# Patient Record
Sex: MALE | Race: OTHER | Employment: OTHER | URBAN - METROPOLITAN AREA
[De-identification: names, ages, dates, MRNs, and addresses within clinical notes are randomized per-mention and may not be internally consistent; named-entity substitution may affect disease eponyms.]

---

## 2020-04-19 ENCOUNTER — APPOINTMENT (OUTPATIENT)
Dept: GENERAL RADIOLOGY | Facility: HOSPITAL | Age: 80
DRG: 207 | End: 2020-04-19
Attending: EMERGENCY MEDICINE
Payer: MEDICARE

## 2020-04-19 ENCOUNTER — HOSPITAL ENCOUNTER (INPATIENT)
Facility: HOSPITAL | Age: 80
LOS: 30 days | Discharge: ANOTHER HOSPITAL | DRG: 207 | End: 2020-05-19
Attending: EMERGENCY MEDICINE | Admitting: EMERGENCY MEDICINE
Payer: MEDICARE

## 2020-04-19 DIAGNOSIS — U07.1 COVID-19 VIRUS INFECTION: Primary | ICD-10-CM

## 2020-04-19 DIAGNOSIS — J96.01 ACUTE RESPIRATORY FAILURE WITH HYPOXIA (HCC): ICD-10-CM

## 2020-04-19 PROCEDURE — 71045 X-RAY EXAM CHEST 1 VIEW: CPT | Performed by: EMERGENCY MEDICINE

## 2020-04-19 PROCEDURE — 99223 1ST HOSP IP/OBS HIGH 75: CPT | Performed by: HOSPITALIST

## 2020-04-19 RX ORDER — HYDROXYCHLOROQUINE SULFATE 200 MG/1
200 TABLET, FILM COATED ORAL 2 TIMES DAILY
Status: DISCONTINUED | OUTPATIENT
Start: 2020-04-20 | End: 2020-04-20

## 2020-04-19 RX ORDER — ACETAMINOPHEN 325 MG/1
650 TABLET ORAL EVERY 6 HOURS PRN
Status: DISCONTINUED | OUTPATIENT
Start: 2020-04-19 | End: 2020-05-19

## 2020-04-19 RX ORDER — ASPIRIN 81 MG/1
81 TABLET ORAL DAILY
Status: DISCONTINUED | OUTPATIENT
Start: 2020-04-20 | End: 2020-04-30 | Stop reason: SDUPTHER

## 2020-04-19 RX ORDER — AZITHROMYCIN 250 MG/1
500 TABLET, FILM COATED ORAL DAILY
Status: DISCONTINUED | OUTPATIENT
Start: 2020-04-20 | End: 2020-04-20

## 2020-04-19 RX ORDER — MORPHINE SULFATE 2 MG/ML
2 INJECTION, SOLUTION INTRAMUSCULAR; INTRAVENOUS EVERY 2 HOUR PRN
Status: DISCONTINUED | OUTPATIENT
Start: 2020-04-19 | End: 2020-04-23

## 2020-04-19 RX ORDER — ONDANSETRON 2 MG/ML
4 INJECTION INTRAMUSCULAR; INTRAVENOUS EVERY 6 HOURS PRN
Status: DISCONTINUED | OUTPATIENT
Start: 2020-04-19 | End: 2020-05-19

## 2020-04-19 RX ORDER — BISACODYL 10 MG
10 SUPPOSITORY, RECTAL RECTAL
Status: DISCONTINUED | OUTPATIENT
Start: 2020-04-19 | End: 2020-04-30

## 2020-04-19 RX ORDER — METRONIDAZOLE 500 MG/1
500 TABLET ORAL 3 TIMES DAILY
Status: ON HOLD | COMMUNITY
End: 2020-05-19

## 2020-04-19 RX ORDER — ATORVASTATIN CALCIUM 10 MG/1
10 TABLET, FILM COATED ORAL NIGHTLY
Status: DISCONTINUED | OUTPATIENT
Start: 2020-04-20 | End: 2020-05-02

## 2020-04-19 RX ORDER — ZOLPIDEM TARTRATE 5 MG/1
5 TABLET ORAL NIGHTLY PRN
Status: DISCONTINUED | OUTPATIENT
Start: 2020-04-19 | End: 2020-05-19

## 2020-04-19 RX ORDER — LEVOFLOXACIN 500 MG/1
500 TABLET, FILM COATED ORAL DAILY
Status: DISCONTINUED | OUTPATIENT
Start: 2020-04-20 | End: 2020-04-20

## 2020-04-19 RX ORDER — CODEINE PHOSPHATE AND GUAIFENESIN 10; 100 MG/5ML; MG/5ML
5 SOLUTION ORAL EVERY 4 HOURS PRN
Status: DISCONTINUED | OUTPATIENT
Start: 2020-04-19 | End: 2020-05-19

## 2020-04-19 RX ORDER — ATORVASTATIN CALCIUM 10 MG/1
10 TABLET, FILM COATED ORAL NIGHTLY
COMMUNITY

## 2020-04-19 RX ORDER — HYDROXYCHLOROQUINE SULFATE 200 MG/1
400 TABLET, FILM COATED ORAL ONCE
Status: COMPLETED | OUTPATIENT
Start: 2020-04-19 | End: 2020-04-19

## 2020-04-19 RX ORDER — NIFEDIPINE 60 MG/1
60 TABLET, EXTENDED RELEASE ORAL DAILY
Status: DISCONTINUED | OUTPATIENT
Start: 2020-04-20 | End: 2020-05-01

## 2020-04-19 RX ORDER — HYDROXYCHLOROQUINE SULFATE 200 MG/1
400 TABLET, FILM COATED ORAL ONCE
Status: COMPLETED | OUTPATIENT
Start: 2020-04-20 | End: 2020-04-20

## 2020-04-19 RX ORDER — MORPHINE SULFATE 4 MG/ML
4 INJECTION, SOLUTION INTRAMUSCULAR; INTRAVENOUS EVERY 2 HOUR PRN
Status: DISCONTINUED | OUTPATIENT
Start: 2020-04-19 | End: 2020-05-11

## 2020-04-19 RX ORDER — PANTOPRAZOLE SODIUM 40 MG/1
40 TABLET, DELAYED RELEASE ORAL
Status: DISCONTINUED | OUTPATIENT
Start: 2020-04-20 | End: 2020-04-23

## 2020-04-19 RX ORDER — HYDROCODONE BITARTRATE AND ACETAMINOPHEN 5; 325 MG/1; MG/1
1 TABLET ORAL EVERY 6 HOURS PRN
Status: DISCONTINUED | OUTPATIENT
Start: 2020-04-19 | End: 2020-04-23

## 2020-04-19 RX ORDER — SODIUM CHLORIDE 9 MG/ML
INJECTION, SOLUTION INTRAVENOUS CONTINUOUS
Status: ACTIVE | OUTPATIENT
Start: 2020-04-19 | End: 2020-04-20

## 2020-04-19 RX ORDER — ENOXAPARIN SODIUM 100 MG/ML
40 INJECTION SUBCUTANEOUS EVERY 12 HOURS SCHEDULED
Status: DISCONTINUED | OUTPATIENT
Start: 2020-04-20 | End: 2020-04-25

## 2020-04-19 RX ORDER — ACETAMINOPHEN 325 MG/1
650 TABLET ORAL EVERY 6 HOURS PRN
COMMUNITY

## 2020-04-19 RX ORDER — LEVOFLOXACIN 500 MG/1
500 TABLET, FILM COATED ORAL DAILY
Status: ON HOLD | COMMUNITY
End: 2020-05-19

## 2020-04-19 RX ORDER — NIFEDIPINE 60 MG/1
60 TABLET, FILM COATED, EXTENDED RELEASE ORAL DAILY
Status: ON HOLD | COMMUNITY
End: 2020-05-19

## 2020-04-19 RX ORDER — ONDANSETRON 2 MG/ML
4 INJECTION INTRAMUSCULAR; INTRAVENOUS EVERY 4 HOURS PRN
Status: COMPLETED | OUTPATIENT
Start: 2020-04-19 | End: 2020-05-10

## 2020-04-19 RX ORDER — AZITHROMYCIN 250 MG/1
500 TABLET, FILM COATED ORAL ONCE
Status: COMPLETED | OUTPATIENT
Start: 2020-04-19 | End: 2020-04-19

## 2020-04-19 RX ORDER — HYDRALAZINE HYDROCHLORIDE 20 MG/ML
10 INJECTION INTRAMUSCULAR; INTRAVENOUS EVERY 4 HOURS PRN
Status: DISCONTINUED | OUTPATIENT
Start: 2020-04-19 | End: 2020-05-19

## 2020-04-20 PROCEDURE — 99232 SBSQ HOSP IP/OBS MODERATE 35: CPT | Performed by: HOSPITALIST

## 2020-04-20 PROCEDURE — 99221 1ST HOSP IP/OBS SF/LOW 40: CPT | Performed by: INTERNAL MEDICINE

## 2020-04-20 RX ORDER — DOXEPIN HYDROCHLORIDE 50 MG/1
1 CAPSULE ORAL DAILY
Status: DISCONTINUED | OUTPATIENT
Start: 2020-04-20 | End: 2020-04-27

## 2020-04-20 RX ORDER — AZITHROMYCIN 250 MG/1
500 TABLET, FILM COATED ORAL DAILY
Status: DISPENSED | OUTPATIENT
Start: 2020-04-20 | End: 2020-04-24

## 2020-04-20 RX ORDER — HYDROXYCHLOROQUINE SULFATE 200 MG/1
200 TABLET, FILM COATED ORAL 2 TIMES DAILY
Status: DISPENSED | OUTPATIENT
Start: 2020-04-20 | End: 2020-04-24

## 2020-04-20 RX ORDER — POTASSIUM CHLORIDE 20 MEQ/1
40 TABLET, EXTENDED RELEASE ORAL EVERY 4 HOURS
Status: COMPLETED | OUTPATIENT
Start: 2020-04-20 | End: 2020-04-20

## 2020-04-20 NOTE — ED NOTES
High flow 02 decreased to 10L. Patient continues to lay in prone position. Socks and blanket given for comfort. Patient in no acute respiratory distress. Will continue to monitor.

## 2020-04-20 NOTE — ED NOTES
O2 decreased to 10L high flow. Patient resting comfortably in prone position. Respirations regular and nonlabored. Will continue to monitor.

## 2020-04-20 NOTE — DIETARY NOTE
ADULT NUTRITION INITIAL ASSESSMENT    RECOMMENDATIONS TO MD:  See Nutrition Intervention     Pt is at moderate nutrition risk. Pt does not meet malnutrition criteria.       NUTRITION DIAGNOSIS/PROBLEM:  Inadequate oral intake related to decreased ability t education needs  - Feeding assistance: meal set up  - Coordination of nutrition care: collaboration with other providers    ANTHROPOMETRICS:  HT: 175.3 cm (5' 9\")       WT: 81.6 kg (180 lb)      BMI: Body mass index is 26.58 kg/m².          BMI Classificat Clinical Dietitian  [phone]

## 2020-04-20 NOTE — PLAN OF CARE
Problem: Patient/Family Goals  Goal: Patient/Family Long Term Goal  Description  Patient's Long Term Goal: To go home    Interventions:    - See additional Care Plan goals for specific interventions  Outcome: Not Progressing  Note:   Patient is currently patient reports new pain  - Anticipate increased pain with activity and pre-medicate as appropriate  Outcome: Progressing  Note:   Denies any fever or body aches.  Although patient was shivering and had a fever earlier today, but denies any issues     Probl has a good o2 wave form in the 70's even with 10L high flow nc.       Problem: CARDIOVASCULAR - ADULT  Goal: Maintains optimal cardiac output and hemodynamic stability  Description  INTERVENTIONS:  - Monitor vital signs, rhythm, and trends  - Monitor for bl

## 2020-04-20 NOTE — PLAN OF CARE
Patient admitted from the Emergency Department overnight. He was able to lay in the prone position intermittently overnight, switching between prone and his side. Patient was received on 10L of oxygen via high flow nasal cannula.  Oxygen was able to be titr pre-medicate as appropriate  Outcome: Progressing     Problem: SAFETY ADULT - FALL  Goal: Free from fall injury  Description  INTERVENTIONS:  - Assess pt frequently for physical needs  - Identify cognitive and physical deficits and behaviors that affect ri as indicated  - Manage/alleviate anxiety  - Monitor for signs/symptoms of CO2 retention  Outcome: Progressing     Problem: CARDIOVASCULAR - ADULT  Goal: Maintains optimal cardiac output and hemodynamic stability  Description  INTERVENTIONS:  - Monitor sanjuana

## 2020-04-20 NOTE — PROGRESS NOTES
Pt admitted through ED this evening. EMS found patient in resp. Distress at home SPO2 60s on room air. Was transported to ED on non-rebreather with SPO2 in 80s. Pneumonia diagnosis at onset of symptoms 6 days ago. SOB and chest pain for last 6 days.     Rap

## 2020-04-20 NOTE — ED NOTES
Patient is resting comfortably in prone position. Continues to be on full continous monitoring and pulse oximetry.  High flow NC 15L

## 2020-04-20 NOTE — H&P
[address] Patient Status:  Emergency    1940 MRN [id number]   Location [address] [facility] Attending Sharad Mayberry MD   Hosp Day # 0 PCP Boubacar Buchanan, are intact, Normal conjunctiva. HENT:  Normocephalic, oral mucosa is moist.  Head:  Normocephalic, atraumatic. Neck:  Supple, non-tender, no carotid bruit, no jugular venous distention, no lymphadenopathy, no thyromegaly.   Respiratory: No appreciable cra and Plan:    Acute respiratory failure with hypoxia  Secondary to COVID-19 pneumonitis. Currently on supplemental O2 pulmonary has been consulted will continue to monitor closely.     Viral pneumonitis likely secondary to COVID-19  Rapid COVID testing posi

## 2020-04-20 NOTE — ED NOTES
Patient able to turn himself onto his stomach. Instructed to lay in prone position as long as tolerated. High flow NC in place on 15L. Patient's oxygen saturation improved from 87% to 93% with proning. Will continue to monitor.

## 2020-04-20 NOTE — PROGRESS NOTES
[facility]    Progress Note    Jahaira Ramirez Patient Status:  Inpatient    1940 MRN [id number]   Location [facility] 1W Attending Peace Moya MD   Hosp Day # 1 PCP Boubacar Buchanan MD        Subjective: 04/19/2020    TP 7.5 04/19/2020    AST 49 (H) 04/19/2020    ALT 53 04/19/2020    DDIMER 2.10 (H) 04/20/2020    CRP 16.30 (H) 04/20/2020    TROP <0.045 04/20/2020    CK 60 04/20/2020       Xr Chest Ap Portable  (cpt=71045)    Result Date: 4/19/2020  CONCLUS

## 2020-04-20 NOTE — CONSULTS
Pulmonary/Critical Care Consultation Note    HPI:   Xin Chandler is a 67year old male with Patient presents with:  Dyspnea NEELAM SOB    Boubacar Buchanan MD    Pt is a 66 yo with h/o HTN and HL who called EMS for SOB and fever.  Pt states he was PRN  zolpidem (AMBIEN) tab 5 mg, 5 mg, Oral, Nightly PRN  Pantoprazole Sodium (PROTONIX) EC tab 40 mg, 40 mg, Oral, QAM AC  bisacodyl (DULCOLAX) rectal suppository 10 mg, 10 mg, Rectal, Daily PRN  azithromycin (ZITHROMAX) tab 500 mg, 500 mg, Oral, Daily  g ALB 3.1 04/19/2020    ALKPHO 45 04/19/2020    BILT 0.6 04/19/2020    TP 7.5 04/19/2020    AST 49 04/19/2020    ALT 53 04/19/2020    DDIMER 2.10 04/20/2020    CRP 16.30 04/20/2020    CK 60 04/20/2020       CXR b/l GGO       ASSESSMENT/PLAN:     COVID-19  C/

## 2020-04-20 NOTE — CM/SW NOTE
PADMAJA spoke with the pt's son Mitra Mcnair via telephone. The pt. Is COVID+. The pt. Lives with his wife in a 2 level home with the bedrooms upstairs. The pt. Has been independent prior to admission with adls, ambulation and driving. The pt's wife also drives.

## 2020-04-20 NOTE — ED INITIAL ASSESSMENT (HPI)
ARRIVES VIA EMS FOR WORSENING SOB AND HYPOXIA. EMS REPORTS PATIENT'S SP02 WAS \"HIGH 60'S\" AND THEN PlACED ON NRB 15L. SP02 DURING TRANSPORT 80'S. PATIENT IS AAO X3, SPEECH CLEAR ALTHOUGH DOES APPEAR DROWSY. STATES HE HAS BEEN SICK FOR 1 WEEK.  DIAGNOSED W

## 2020-04-20 NOTE — ED PROVIDER NOTES
Patient Seen in: [facility] Emergency Department    History   Patient presents with:  Dyspnea NEELAM SOB      HPI    Patient presents to the ED via EMS for worsening shortness of breath and fevers.   Diagnosed with pneumonia a week ago at the onset of exam.  Stethoscope and any equipment used during my examination was cleaned with super sani-cloth germicidal wipes following the exam.     Physical Exam   Constitutional: He is oriented to person, place, and time.  He appears well-developed and well-nourish limits   RAPID SARS-COV-2 BY PCR - Abnormal; Notable for the following components:    Rapid SARS-CoV-2 by PCR Detected (*)     All other components within normal limits   CBC W/ DIFFERENTIAL - Abnormal; Notable for the following components:    WBC 11.9 (*) Hydroxychloroquine Sulfate (PLAQUENIL) tab 400 mg (has no administration in time range)   azithromycin (ZITHROMAX) tab 500 mg (has no administration in time range)         MDM      04/19/20 2015 04/19/20 2030 04/19/20 2045 04/19/20 2115   BP: 134/71 Clinical Impression:  COVID-19 virus infection  (primary encounter diagnosis)  Acute respiratory failure with hypoxia ([facility] [address]    Disposition:  Admit    Follow-up:  No follow-up provider specified.     Medications Prescribed:  There are no discharge medica

## 2020-04-21 PROCEDURE — 99233 SBSQ HOSP IP/OBS HIGH 50: CPT | Performed by: INTERNAL MEDICINE

## 2020-04-21 PROCEDURE — 99232 SBSQ HOSP IP/OBS MODERATE 35: CPT | Performed by: HOSPITALIST

## 2020-04-21 NOTE — PROGRESS NOTES
Pulmonary/Critical Care Follow Up Note    HPI:   Faisal Hernandez is a 67year old male with Patient presents with:  Dyspnea NEELAM SOB      PCP Boubacar Buchanan MD  Admission Attending Maritza Blair [facility] Day #2    Denies sob  Denies cou MG/5ML syrup 5 mL, 5 mL, Oral, Q4H PRN      Allergies:  No Known Allergies        PHYSICAL EXAM:   Blood pressure 102/62, pulse 89, temperature 97 °F (36.1 °C), temperature source Oral, resp.  rate (!) 34, height 5' 9\" (1.753 m), weight 180 lb (81.6 kg), S

## 2020-04-21 NOTE — PLAN OF CARE
Pt tolerating 10L high flow NC. Pt laying prone this shift. Uses call light. Bed alarm on. Will continue to monitor.      Problem: Patient/Family Goals  Goal: Patient/Family Long Term Goal  Description  Patient's Long Term Goal: To go home    [facility] Free from fall injury  Description  INTERVENTIONS:  - Assess pt frequently for physical needs  - Identify cognitive and physical deficits and behaviors that affect risk of falls.   - [place] fall precautions as indicated by assessment.  - Educate pt/famil for assistance (call light)  - Provide an  as needed  - Communicate barriers and strategies to overcome with those who interact with patient  Outcome: Progressing     Problem: RESPIRATORY - ADULT  Goal: Achieves optimal ventilation and oxygenati Progressing

## 2020-04-21 NOTE — PLAN OF CARE
Problem: Patient/Family Goals  Goal: Patient/Family Long Term Goal  Description  Patient's Long Term Goal: To go home    Interventions:    - See additional Care Plan goals for specific interventions  Outcome: Not Progressing  Note:   Patient remains in t any fevers or body aches during RNs shift     Problem: SAFETY ADULT - FALL  Goal: Free from fall injury  Description  INTERVENTIONS:  - Assess pt frequently for physical needs  - Identify cognitive and physical deficits and behaviors that affect risk of fa puncture sites for bleeding and/or hematoma  - Assess quality of pulses, skin color and temperature  - Assess for signs of decreased coronary artery perfusion - ex.  Angina  - Evaluate fluid balance, assess for edema, trend weights  Outcome: Progressing  No

## 2020-04-21 NOTE — PROGRESS NOTES
[facility]    Progress Note    Tanisha Stearns Patient Status:  Inpatient    1940 MRN [id number]   Location [facility] 1W Attending Yolie Beard MD   Hosp Day # 2 PCP Boubacar Buchanan MD        Subjective: 04/21/2020    CA 8.4 (L) 04/21/2020    ALB 3.1 (L) 04/19/2020    ALKPHO 45 04/19/2020    BILT 0.6 04/19/2020    TP 7.5 04/19/2020    AST 49 (H) 04/19/2020    ALT 53 04/19/2020    DDIMER 2.14 (H) 04/21/2020    CRP 18.20 (H) 04/21/2020    MG 2.4 04/21/2020

## 2020-04-22 PROCEDURE — 99233 SBSQ HOSP IP/OBS HIGH 50: CPT | Performed by: INTERNAL MEDICINE

## 2020-04-22 PROCEDURE — 99233 SBSQ HOSP IP/OBS HIGH 50: CPT | Performed by: HOSPITALIST

## 2020-04-22 RX ORDER — POTASSIUM CHLORIDE 20 MEQ/1
40 TABLET, EXTENDED RELEASE ORAL ONCE
Status: DISCONTINUED | OUTPATIENT
Start: 2020-04-22 | End: 2020-04-22

## 2020-04-22 RX ORDER — POTASSIUM CHLORIDE 20 MEQ/1
40 TABLET, EXTENDED RELEASE ORAL ONCE
Status: COMPLETED | OUTPATIENT
Start: 2020-04-22 | End: 2020-04-22

## 2020-04-22 NOTE — PLAN OF CARE
Problem: Patient/Family Goals  Goal: Patient/Family Long Term Goal  Description  Patient's Long Term Goal: To go home    Interventions:    - See additional Care Plan goals for specific interventions  Outcome: Not Progressing  Note:   Patient remains in P appropriate  - Consider OT/PT consult to assist with strengthening/mobility  - Encourage toileting schedule  Outcome: Progressing  Note:   Bed locked and lowest position, call light within reach of patient, alarms on, frequent nursing rounds, rails x3 up Dr. Iris Huston we placed a Bipap ventilation helmet on the patient at 100% fi02 sitting at a 90 degree angle in bed, tolerating well o2 saturations 98%-Appetite still not the greatest but has definitely been improving.  Called pts son Mirna Castillo to update him on pa

## 2020-04-22 NOTE — PROGRESS NOTES
Pulmonary/Critical Care Follow Up Note    HPI:   Jason Ellsworth is a 67year old male with Patient presents with:  Dyspnea NEELAM SOB      PCP Boubacar Buchanan MD  Admission Attending Dalia Cole [facility] Day #3    Denies sob  Denies cou MG/5ML syrup 5 mL, 5 mL, Oral, Q4H PRN      Allergies:  No Known Allergies        PHYSICAL EXAM:   Blood pressure 107/51, pulse 91, temperature 97.7 °F (36.5 °C), temperature source Axillary, resp.  rate (!) 33, height 5' 9\" (1.753 m), weight 180 lb (81.6

## 2020-04-22 NOTE — PROGRESS NOTES
[facility]    Progress Note    Homa Cobb Patient Status:  Inpatient    1940 MRN [id number]   Location [facility] 1W Attending Scott Lane MD   Hosp Day # 3 PCP Boubacar Buchanan MD        Subjective: ALT 53 04/19/2020    DDIMER 1.86 (H) 04/22/2020    CRP 18.70 (H) 04/22/2020    MG 2.3 04/22/2020    PHOS 2.8 04/22/2020    TROP <0.045 04/20/2020    CK 60 04/20/2020                      BELLA AMOR

## 2020-04-22 NOTE — PLAN OF CARE
Pt A/O x 4, remains prone. O2 sat 89-93 on 14L HFNC, intermittently tachypneic. SBP low 100's. No bm through shift. Urine output 550. All precautions maintained. Continue to monitor.    Problem: Patient/Family Goals  Goal: Patient/Family Short Term Goal  [place] deficits and behaviors that affect risk of falls.   - [place] fall precautions as indicated by assessment.  - Educate pt/family on patient safety including physical limitations  - Instruct pt to call for assistance with activity based on assessment  - Mod interact with patient  Outcome: Progressing     Problem: RESPIRATORY - ADULT  Goal: Achieves optimal ventilation and oxygenation  Description  INTERVENTIONS:  - Assess for changes in respiratory status  - Assess for changes in mentation and behavior  - Pos

## 2020-04-23 ENCOUNTER — APPOINTMENT (OUTPATIENT)
Dept: GENERAL RADIOLOGY | Facility: HOSPITAL | Age: 80
DRG: 207 | End: 2020-04-23
Attending: INTERNAL MEDICINE
Payer: MEDICARE

## 2020-04-23 ENCOUNTER — APPOINTMENT (OUTPATIENT)
Dept: GENERAL RADIOLOGY | Facility: HOSPITAL | Age: 80
DRG: 207 | End: 2020-04-23
Attending: HOSPITALIST
Payer: MEDICARE

## 2020-04-23 PROCEDURE — 71045 X-RAY EXAM CHEST 1 VIEW: CPT | Performed by: INTERNAL MEDICINE

## 2020-04-23 PROCEDURE — 71045 X-RAY EXAM CHEST 1 VIEW: CPT | Performed by: HOSPITALIST

## 2020-04-23 PROCEDURE — 99232 SBSQ HOSP IP/OBS MODERATE 35: CPT | Performed by: HOSPITALIST

## 2020-04-23 PROCEDURE — 99291 CRITICAL CARE FIRST HOUR: CPT | Performed by: INTERNAL MEDICINE

## 2020-04-23 RX ORDER — DEXMEDETOMIDINE HYDROCHLORIDE 4 UG/ML
INJECTION, SOLUTION INTRAVENOUS
Status: COMPLETED
Start: 2020-04-23 | End: 2020-04-23

## 2020-04-23 RX ORDER — DEXMEDETOMIDINE HYDROCHLORIDE 4 UG/ML
INJECTION, SOLUTION INTRAVENOUS CONTINUOUS
Status: CANCELLED | OUTPATIENT
Start: 2020-04-23

## 2020-04-23 RX ORDER — MORPHINE SULFATE 2 MG/ML
2 INJECTION, SOLUTION INTRAMUSCULAR; INTRAVENOUS EVERY 2 HOUR PRN
Status: DISCONTINUED | OUTPATIENT
Start: 2020-04-23 | End: 2020-05-19

## 2020-04-23 RX ORDER — DEXMEDETOMIDINE HYDROCHLORIDE 4 UG/ML
INJECTION, SOLUTION INTRAVENOUS CONTINUOUS
Status: DISCONTINUED | OUTPATIENT
Start: 2020-04-23 | End: 2020-05-01

## 2020-04-23 RX ORDER — LORAZEPAM 2 MG/ML
1 INJECTION INTRAMUSCULAR EVERY 4 HOURS PRN
Status: DISCONTINUED | OUTPATIENT
Start: 2020-04-23 | End: 2020-05-19

## 2020-04-23 NOTE — PROGRESS NOTES
Pulmonary/Critical Care Follow Up Note    HPI:   Citlali Chavez is a 67year old male with Patient presents with:  Dyspnea NEELAM SOB      PCP Boubacar Buchanan MD  Admission Attending [facility]    [facility] zolpidem (AMBIEN) tab 5 mg, 5 mg, Oral, Nightly PRN  •  Pantoprazole Sodium (PROTONIX) EC tab 40 mg, 40 mg, Oral, QAM AC  •  bisacodyl (DULCOLAX) rectal suppository 10 mg, 10 mg, Rectal, Daily PRN  •  guaiFENesin-codeine (ROBITUSSIN AC) 100-10 MG/5ML syrup

## 2020-04-23 NOTE — RESPIRATORY THERAPY NOTE
Patient tachypneic, not tolerating gomez on BIPAP. Switched over to [facility] with hepa exp filter. Current settings IPAP10, EPAP 5, FIO2 100%. Patient is pulling adequate Vt. sats are in the low 90's. ABG results on settings above:       Ref.  Range 4/23/2020 05:

## 2020-04-23 NOTE — PROGRESS NOTES
Received call from RN at [facility] regarding patient's refusal to wear bipap helmet. SPO2 high 80's. Will try bipap mask instead of helmet and see how he tolerates it. Current settings are 100%, +10/5. Patient tolerating well with SPO2 90-92%. RR 40.   ABG

## 2020-04-23 NOTE — PLAN OF CARE
Problem: Patient/Family Goals  Goal: Patient/Family Long Term Goal  Description  Patient's Long Term Goal: To go home    Interventions:    - See additional Care Plan goals for specific interventions  Outcome: Not Progressing  Note:   Patient remains in t as appropriate  - Consider OT/PT consult to assist with strengthening/mobility  - Encourage toileting schedule  Outcome: Progressing  Note:   Bed locked and lowest position, call light within reach of patient, alarms on, frequent nursing rounds     Problem placed to right nare, confirmed by Xray in the stomach. No urine output during RNs shift today. Patient was bladder scanned, over 560 of urine. Villareal placed 475cc urine output noted immediately. Cass urine, U/A and culture sent.  On Precedex gtt tolerating

## 2020-04-23 NOTE — PROGRESS NOTES
[facility]    Progress Note    Bobbi Menezes Patient Status:  Inpatient    1940 MRN [id number]   Location [facility] 1W Attending Hermelindo Bojorquez MD   Hosp Day # 4 PCP Boubacar Buchanan MD        Subjective: 04/19/2020    DDIMER 12.50 (H) 04/23/2020    CRP 20.70 (H) 04/23/2020    MG 2.3 04/22/2020    PHOS 2.8 04/22/2020    TROP <0.045 04/20/2020    CK 60 04/20/2020                      BELLA AMOR

## 2020-04-23 NOTE — PROGRESS NOTES
Called by RN stating patient refusing to wear bipap helmet all evening and SpO2 82-85% on 15L rebreather mask and HFNL 8L. Patient spoken to multiple times that he needs helmet and risking intubation.   RN requesting sedation for patient as he appears agit

## 2020-04-23 NOTE — PROGRESS NOTES
0025 pt refusing to continue wearing bipap helmet because he states it is extremely hot and he cannot take it anymore. Put pt on non rebreather mask 15L and HFNC 8L together.  Per Dr. Татьяна Thomas, as long as pt sat is >86% consistently, he is okay to stay off o

## 2020-04-23 NOTE — PROGRESS NOTES
0400 Dr. Jesse Hansen called due to pt RR 50-60s on bipap helmet. States okay that respirations are higher as long as his saturations stay high 80's. Verified okay to keep on precedex.      0430 Pt refusing helmet and getting very hot with excessive condensatio

## 2020-04-24 PROCEDURE — 99291 CRITICAL CARE FIRST HOUR: CPT | Performed by: INTERNAL MEDICINE

## 2020-04-24 NOTE — PLAN OF CARE
Problem: Patient/Family Goals  Goal: Patient/Family Short Term Goal  Description  Patient's Short Term Goal: to be able to lay on my back again  Interventions:   - See additional Care Plan goals for specific interventions  4/24/2020 1728 by Michelle Núñez frequently for physical needs  - Identify cognitive and physical deficits and behaviors that affect risk of falls.   - [place] fall precautions as indicated by assessment.  - Educate pt/family on patient safety including physical limitations  - Instruct p difficulty  - Respiratory Therapy support as indicated  - Manage/alleviate anxiety  - Monitor for signs/symptoms of CO2 retention  4/24/2020 1728 by Regina Austin, RN  Outcome: Progressing  Pt Bipap dependent, able to tolerate FIO2 decrease from 100 to 85%. tolerated  - Evaluate effectiveness of GI medications  - Encourage mobilization and activity  - Obtain nutritional consult as needed  - Establish a toileting routine/schedule  - Consider collaborating with pharmacy to review patient's medication profile  O

## 2020-04-24 NOTE — DIETARY NOTE
ADULT NUTRITION REASSESSMENT     RECOMMENDATIONS TO MD:  See Nutrition Intervention for TF orders. Maintain  70-80% of goal nutrition over the first 5-7 days. Pt is at high nutrition risk.       NUTRITION DIAGNOSIS/PROBLEM:  Inadequate oral intake re rate of 60 ml/hr via Keofeed on ave 22 hr infusion time. · 1 pack ProSource daily              · Water flushes of 30ml q 4 hr (180 ml). (TF provides 1584 kcal, 72 g pro & 1049 ml free water + ProSource 40 kcal, 11 g Pro & 45 ml vol.  Total of 1624 kcal, [id number] with limited contact restrictions and Isolation Precautions. - Fluid Accumulation: none   - Skin Integrity: at risk.      NUTRITION PRESCRIPTION: Estimated Nutrition needs:   Calories: 3428-9080 calories/day (25-30 calories per kg Actual body wt (

## 2020-04-24 NOTE — PLAN OF CARE
Pt A/O x 4. O2 sat 89-93 on continuous Bipap mask. Respirations improved significantly compared to the previous night (28-40). Pt was able to sleep a good portion of the night. Villareal intact and draining. All precautions maintained. Continue to monitor. Instruct pt to call for assistance with activity based on assessment  - Modify environment to reduce risk of injury  - Provide assistive devices as appropriate  - Consider OT/PT consult to assist with strengthening/mobility  - Encourage toileting schedule in respiratory status  - Assess for changes in mentation and behavior  - Position to facilitate oxygenation and minimize respiratory effort  - Oxygen supplementation based on oxygen saturation or ABGs  - Provide Smoking Cessation handout, if applicable  -

## 2020-04-24 NOTE — PAYOR COMM NOTE
--------------  ADMISSION REVIEW     Shailesh Hodge MA [facility]  Subscriber #:  [id number]  Authorization Number: [id number]      Admit date: 4/19/20  Admit time: 2200       Admitting Physician: Daniella Kurtz MD  Attending Physician:  Adela Joe MD  [facility] Social History and Family History elements reviewed from today, pertinent positives to the presenting problem noted.     Physical Exam     ED Triage Vitals   BP 04/19/20 1935 125/53   Pulse 04/19/20 1935 98   Resp 04/19/20 1943 (!) 30   Temp 04/19/20 1935 9 All other components within normal limits   HEPATIC FUNCTION PANEL (7) - Abnormal; Notable for the following components:    AST 49 (*)     Albumin 3.1 (*)     All other components within normal limits   PROCALCITONIN - Abnormal; Notable for the following Reading: Incomplete right bundle branch block, left axis, nonspecific ST depression, abnormal EKG             Imaging Results Available and Reviewed while in ED: Xr Chest Ap Portable  (cpt=71045)    Result Date: 4/19/2020  CONCLUSION:   Bilateral perihilar ED Course: Presents to the ED with severe respiratory distress symptoms. Severely hypoxic even on a nonrebreather. Clinically suspicious for COVID-19. Patient placed on high flow nasal cannula and placed in a prone position. Significantly improved.   O2 Date:  4/19/2020  Date of Admission:  4/19/2020    Chief Complaint:  Patient presents with:  Dyspnea NEELAM SOB      History of Present Illness:  Victoriano Garza is a(n) 67year old male, with no significant past medical history other than hypertension hype Respiratory: No appreciable crackles, respirations are moderately-labored, breath sounds are equal, symmetrical chest wall expansion. Cardiovascular:  Normal rate, regular rhythm, no murmur, no edema.   Gastrointestinal:  Soft, non-tender, non-distended, n Viral pneumonitis likely secondary to COVID-19  Rapid COVID testing positive, inflammatory markers elevated. Patient be started on Plaquenil and Zithromax, will continue the same.     Essential hypertension  Blood pressure well controlled resume home medic Date Action Dose Route User    4/23/2020 2014 Given 200 mg Oral Sandie Cárdenas, RN      Pantoprazole Sodium (PROTONIX) 40 mg in Sodium Chloride 0.9 % 10 mL IV push     Date Action Dose Route User    4/23/2020 1245 Given 40 mg Intravenous Mello Vo acetaminophen (TYLENOL) tab 650 mg, 650 mg, Oral, Q6H PRN  NIFEdipine ER osmotic release (PROCARDIA XL) 24 hr tab 60 mg, 60 mg, Oral, Daily  ondansetron HCl (ZOFRAN) injection 4 mg, 4 mg, Intravenous, Q6H PRN  morphINE sulfate (PF) 2 MG/ML injection 2 mg, Last data filed at 4/20/2020 0351      Gross per 24 hour   Intake 500 ml   Output 480 ml   Net 20 ml      NAD  Prone  Mild inc WOB  Head AT/NC  Anicteric  Lung cta  CV reg tachy  Abd soft NT/ND  Ext No edema  Skin No rash  Psych Normal mood        LABS PLEASE FAX DAYS CERTIFIED AND NEXT REVIEW DATES

## 2020-04-24 NOTE — PROGRESS NOTES
Pulmonary/Critical Care Follow Up Note    HPI:   Thierry Saenz is a 67year old male with Patient presents with:  Dyspnea NEELAM SOB      PCP Boubacar Buchanan MD  Admission Attending Janell Saavedra 15 Day #5      Pt on NIV  States br HCl (APRESOLINE) injection 10 mg, 10 mg, Intravenous, Q4H PRN  •  zolpidem (AMBIEN) tab 5 mg, 5 mg, Oral, Nightly PRN  •  bisacodyl (DULCOLAX) rectal suppository 10 mg, 10 mg, Rectal, Daily PRN  •  guaiFENesin-codeine (ROBITUSSIN AC) 100-10 MG/5ML syrup 5

## 2020-04-24 NOTE — CM/SW NOTE
Care Progression Note:  Active Acute Medical Issue:   COVID-19 virus infection     Other Contributing Medical Factors/Dx. : COVID 19+ , not intubated, currently on BiPAP    Length of stay: 5  Avoidable Delays:   Discharge Barriers:   Expected discharge date

## 2020-04-25 ENCOUNTER — APPOINTMENT (OUTPATIENT)
Dept: GENERAL RADIOLOGY | Facility: HOSPITAL | Age: 80
DRG: 207 | End: 2020-04-25
Attending: INTERNAL MEDICINE
Payer: MEDICARE

## 2020-04-25 PROCEDURE — 71045 X-RAY EXAM CHEST 1 VIEW: CPT | Performed by: INTERNAL MEDICINE

## 2020-04-25 PROCEDURE — 99233 SBSQ HOSP IP/OBS HIGH 50: CPT | Performed by: INTERNAL MEDICINE

## 2020-04-25 RX ORDER — ENOXAPARIN SODIUM 100 MG/ML
0.6 INJECTION SUBCUTANEOUS EVERY 12 HOURS SCHEDULED
Status: DISCONTINUED | OUTPATIENT
Start: 2020-04-25 | End: 2020-05-01

## 2020-04-25 NOTE — PLAN OF CARE
Problem: Patient/Family Goals  Goal: Patient/Family Long Term Goal  Description  Patient's Long Term Goal: To go home    Interventions:    - See additional Care Plan goals for specific interventions  Outcome: Progressing  Goal: Patient/Family Short Term physical deficits and behaviors that affect risk of falls.   - [place] fall precautions as indicated by assessment.  - Educate pt/family on patient safety including physical limitations  - Instruct pt to call for assistance with activity based on assessme those who interact with patient  Outcome: Progressing     Problem: CARDIOVASCULAR - ADULT  Goal: Maintains optimal cardiac output and hemodynamic stability  Description  INTERVENTIONS:  - Monitor vital signs, rhythm, and trends  - Monitor for bleeding, hyp collaborating with pharmacy to review patient's medication profile  Outcome: Progressing  Goal: Maintains adequate nutritional intake (undernourished)  Description  INTERVENTIONS:  - Monitor percentage of each meal consumed  - Identify factors contributing

## 2020-04-25 NOTE — PROGRESS NOTES
S- made visit at nurse and patient request. Patient resting in bed  O- Patient expressed desire for prayer, he expressed acceptance about    his current health condition  A-  offered prayer and emotional support  P- No follow-up needed, unl

## 2020-04-26 ENCOUNTER — APPOINTMENT (OUTPATIENT)
Dept: GENERAL RADIOLOGY | Facility: HOSPITAL | Age: 80
DRG: 207 | End: 2020-04-26
Attending: INTERNAL MEDICINE
Payer: MEDICARE

## 2020-04-26 PROCEDURE — 71045 X-RAY EXAM CHEST 1 VIEW: CPT | Performed by: INTERNAL MEDICINE

## 2020-04-26 PROCEDURE — 99233 SBSQ HOSP IP/OBS HIGH 50: CPT | Performed by: INTERNAL MEDICINE

## 2020-04-26 NOTE — PLAN OF CARE
Problem: Patient Centered Care  Goal: Patient preferences are identified and integrated in the patient's plan of care  Description  Interventions:  - What would you like us to know as we care for you?  I speak [place]  - Provide timely, complete, and accu schedule  Outcome: Progressing     Problem: Altered Communication/Language Barrier  Goal: Patient/Family is able to understand and participate in their care  Description  Interventions:  - Assess communication ability and preferred communication style  - I ordered  - Evaluate effectiveness of ordered antiemetic medications  - Provide nonpharmacologic comfort measures as appropriate  - Advance diet as tolerated, if ordered  - Obtain nutritional consult as needed  - Evaluate fluid balance  Outcome: Progressing or ABGs  - Provide Smoking Cessation handout, if applicable  - Encourage broncho-pulmonary hygiene including cough, deep breathe, Incentive Spirometry  - Assess the need for suctioning and perform as needed  - Assess and instruct to report SOB or any respi

## 2020-04-26 NOTE — PLAN OF CARE
Alert x 3. Increased respiratory distress mid-day - did not tolerate attempt to place on Airvo - back to BiPAP 100% FiO2 this afternoon. L-sided CP - EKG completed, trop 0.097. Morphine prn and nitro paste 0.5in improved pain and breathing. No BM.  Ethelda Cocks assist with strengthening/mobility  - Encourage toileting schedule  Outcome: Progressing     Problem: Altered Communication/Language Barrier  Goal: Patient/Family is able to understand and participate in their care  Description  Interventions:  - Assess co retention  Outcome: Not Progressing

## 2020-04-26 NOTE — RESPIRATORY THERAPY NOTE
Pt trialed on high flow high humidity 30L 100%, Pt saturating 86-88%. Pt switched over to BIPAP 12/8/100%.

## 2020-04-26 NOTE — PROGRESS NOTES
[facility]    Progress Note      Assessment and Plan:   1. Novel coronavirus infection–the patient is on BiPAP 12/8 and the chest x-ray today suggests slight worsening of the infiltrates.   The patient has received azithromycin, Plaquenil a DDIMER >20.00 04/26/2020    CRP 5.33 04/26/2020     Chest x-ray–slight increase in diffuse left greater than right infiltrates    Jani Ly MD  Medical Director, [address], [facility]  Medical Director, [facility]  Pager: [phone]

## 2020-04-27 ENCOUNTER — APPOINTMENT (OUTPATIENT)
Dept: GENERAL RADIOLOGY | Facility: HOSPITAL | Age: 80
DRG: 207 | End: 2020-04-27
Attending: INTERNAL MEDICINE
Payer: MEDICARE

## 2020-04-27 PROCEDURE — 99291 CRITICAL CARE FIRST HOUR: CPT | Performed by: INTERNAL MEDICINE

## 2020-04-27 PROCEDURE — 71045 X-RAY EXAM CHEST 1 VIEW: CPT | Performed by: INTERNAL MEDICINE

## 2020-04-27 NOTE — PROGRESS NOTES
Pulmonary/Critical Care Follow Up Note    HPI:   Jahaira Ramirez is a 67year old male with Patient presents with:  Dyspnea NEELAM SOB      PCP Boubacar Buchanan MD  [facility]      Pt on NIV  C/o press Intravenous, Q2H PRN  •  hydrALAzine HCl (APRESOLINE) injection 10 mg, 10 mg, Intravenous, Q4H PRN  •  zolpidem (AMBIEN) tab 5 mg, 5 mg, Oral, Nightly PRN  •  bisacodyl (DULCOLAX) rectal suppository 10 mg, 10 mg, Rectal, Daily PRN  •  guaiFENesin-codeine (

## 2020-04-27 NOTE — PLAN OF CARE
Problem: Patient/Family Goals  Goal: Patient/Family Long Term Goal  Description  Patient's Long Term Goal: To go home    Interventions:    - See additional Care Plan goals for specific interventions  Outcome: Progressing  Goal: Patient/Family Short Term physical deficits and behaviors that affect risk of falls.   - Vidal fall precautions as indicated by assessment.  - Educate pt/family on patient safety including physical limitations  - Instruct pt to call for assistance with activity based on assessme those who interact with patient  Outcome: Progressing     Problem: RESPIRATORY - ADULT  Goal: Achieves optimal ventilation and oxygenation  Description  INTERVENTIONS:  - Assess for changes in respiratory status  - Assess for changes in mentation and behav vomiting  Description  INTERVENTIONS:  - Maintain adequate hydration with IV or PO as ordered and tolerated  - Nasogastric tube to low intermittent suction as ordered  - Evaluate effectiveness of ordered antiemetic medications  - Provide nonpharmacologic c

## 2020-04-27 NOTE — DIETARY NOTE
ADULT NUTRITION REASSESSMENT     RECOMMENDATIONS TO MD:  See Nutrition Intervention for EN orders. Pt is at high nutrition risk.       NUTRITION DIAGNOSIS/PROBLEM:  Inadequate oral intake related to decreased ability to consume sufficient amount of ener with fiber to help in motility. Na & Osmolality starting to elevate, noting 1.7 Liter free water Deficit. Will slightly increase water flushes from 30 to 60 ml q 4 hrs. And will monitor and adjust as needed.  Per MD, COVID-19, c/b AHRF/ARDS, Currently [facility] [place] Current Appetite: Poor prior to NPO/TF  Intake: 0% ( likely due to proning). NPO now  Intake Meeting Needs: TPN meeting needs at full nutrition.    Food Allergies: No   Cultural/Ethnic/Buddhism Preferences: Obtained    MEDICATIONS : reviewed :  Precedex

## 2020-04-28 PROCEDURE — 99291 CRITICAL CARE FIRST HOUR: CPT | Performed by: INTERNAL MEDICINE

## 2020-04-28 NOTE — PLAN OF CARE
Problem: Patient Centered Care  Goal: Patient preferences are identified and integrated in the patient's plan of care  Description  Interventions:  - What would you like us to know as we care for you?  I speak [place]  - Provide timely, complete, and accu schedule  Outcome: Progressing     Problem: Altered Communication/Language Barrier  Goal: Patient/Family is able to understand and participate in their care  Description  Interventions:  - Assess communication ability and preferred communication style  - I

## 2020-04-28 NOTE — PLAN OF CARE
Patient on continuous BiPAP at 100%. Patient's O2 sats are 88-92%. Patient states no discomfort but is tachypneic. Tube feedings through NG. Tube feeding changed to Jevity from Osmolite this morning per nutrition. Patient tolerating feedings well. No BM.  V management  - Manage/alleviate anxiety  - Utilize distraction and/or relaxation techniques  - Monitor for opioid side effects  - Notify MD/LIP if interventions unsuccessful or patient reports new pain  - Anticipate increased pain with activity and pre-medi care  Description  Interventions:  - Assess communication ability and preferred communication style  - Implement communication aides and strategies  - Use visual cues when possible  - Listen attentively, be patient, do not interrupt  - Minimize distraction if ordered  - Obtain nutritional consult as needed  - Evaluate fluid balance  Outcome: Progressing  Goal: Maintains or returns to baseline bowel function  Description  INTERVENTIONS:  - Assess bowel function  - Maintain adequate hydration with IV or PO as

## 2020-04-28 NOTE — PROGRESS NOTES
Pulmonary/Critical Care Follow Up Note    HPI:   Yovany Roach is a 67year old male with Patient presents with:  Dyspnea NEELAM SOB      PCP Boubacar Buchanan MD  Admission Attending Janell Rodriguez 15 Day #9      Pt on NIV  No pain mg, Intravenous, Q4H PRN  •  zolpidem (AMBIEN) tab 5 mg, 5 mg, Oral, Nightly PRN  •  bisacodyl (DULCOLAX) rectal suppository 10 mg, 10 mg, Rectal, Daily PRN  •  guaiFENesin-codeine (ROBITUSSIN AC) 100-10 MG/5ML syrup 5 mL, 5 mL, Oral, Q4H PRN      Allergie

## 2020-04-29 ENCOUNTER — APPOINTMENT (OUTPATIENT)
Dept: GENERAL RADIOLOGY | Facility: HOSPITAL | Age: 80
DRG: 207 | End: 2020-04-29
Attending: INTERNAL MEDICINE
Payer: MEDICARE

## 2020-04-29 PROCEDURE — 71045 X-RAY EXAM CHEST 1 VIEW: CPT | Performed by: INTERNAL MEDICINE

## 2020-04-29 PROCEDURE — 99291 CRITICAL CARE FIRST HOUR: CPT | Performed by: INTERNAL MEDICINE

## 2020-04-29 NOTE — PLAN OF CARE
Tolerated Vapotherm at 30L 100% with NRB until 9pm. Sats dropped to 80's--Bipap applied. Pt tolerating Bipap overnight, fio2 85%. Frequent oral care due to mouth being severely dry. Blood tinged secretions removed. Villareal in place.  Ng tube to tube feeds-booker Monitor for opioid side effects  - Notify MD/LIP if interventions unsuccessful or patient reports new pain  - Anticipate increased pain with activity and pre-medicate as appropriate  Outcome: Progressing     Problem: SAFETY ADULT - FALL  Goal: Free from fa style  - Implement communication aides and strategies  - Use visual cues when possible  - Listen attentively, be patient, do not interrupt  - Minimize distractions  - Allow time for understanding and response  - Establish method for patient to ask for assi indicated  - Evaluate effectiveness of antiarrhythmic and heart rate control medications as ordered  - Initiate emergency measures for life threatening arrhythmias  - Monitor electrolytes and administer replacement therapy as ordered  Outcome: Progressing

## 2020-04-29 NOTE — PLAN OF CARE
On Bipap all day today at % fio2, desat's quickly with oral care, needs frequent oral care, mouth severly dry with dry coated secretions and dry blood and back of mouth/throat.  Patient did not want to try vapotherm this afternoon, seemed to be less c and/or relaxation techniques  - Monitor for opioid side effects  - Notify MD/LIP if interventions unsuccessful or patient reports new pain  - Anticipate increased pain with activity and pre-medicate as appropriate  Outcome: Progressing     Problem: SAFETY and preferred communication style  - Implement communication aides and strategies  - Use visual cues when possible  - Listen attentively, be patient, do not interrupt  - Minimize distractions  - Allow time for understanding and response  - Establish method vital signs, obtain 12 lead EKG if indicated  - Evaluate effectiveness of antiarrhythmic and heart rate control medications as ordered  - Initiate emergency measures for life threatening arrhythmias  - Monitor electrolytes and administer replacement therap

## 2020-04-29 NOTE — PLAN OF CARE
Updates given to mj Lopez Fees, no issues or concerns. Able to decrease Fio2 to 80% on bipap. Tolerated Vapotherm at 30L 100% with NRB for 3+ hours.  Frequent oral care, mouth severely dry, blood tinged yellow hardened secretions swabbed and able to remove fr interventions unsuccessful or patient reports new pain  - Anticipate increased pain with activity and pre-medicate as appropriate  Outcome: Progressing     Problem: SAFETY ADULT - FALL  Goal: Free from fall injury  Description  INTERVENTIONS:  - Assess pt Use visual cues when possible  - Listen attentively, be patient, do not interrupt  - Minimize distractions  - Allow time for understanding and response  - Establish method for patient to ask for assistance (call light)  - Provide an  as needed heart rate control medications as ordered  - Initiate emergency measures for life threatening arrhythmias  - Monitor electrolytes and administer replacement therapy as ordered  Outcome: Progressing     Problem: GASTROINTESTINAL - ADULT  Goal: Minimal or ab

## 2020-04-29 NOTE — PROGRESS NOTES
Pulmonary/Critical Care Follow Up Note    HPI:   Bobbi Menezes is a 67year old male with Patient presents with:  Dyspnea NEELAM SOB      PCP Boubacar Buchanan MD  Admission Attending Janell Leonardo 15 Day #10      Tolerated Vapotherm injection 10 mg, 10 mg, Intravenous, Q4H PRN  •  zolpidem (AMBIEN) tab 5 mg, 5 mg, Oral, Nightly PRN  •  bisacodyl (DULCOLAX) rectal suppository 10 mg, 10 mg, Rectal, Daily PRN  •  guaiFENesin-codeine (ROBITUSSIN AC) 100-10 MG/5ML syrup 5 mL, 5 mL, Oral, Q    HTN  NL  Plan back on CCB        HL  Plan statin    FEN  Low appetite  Plan  TFs     DVT px  Started on high dose Px       EOL  Full code    D/w RN  RN d/w with family today  He noted he did not need a daily call with MD unless sig changes    31 min C

## 2020-04-29 NOTE — CONSULTS
[place] [address] Patient Status:  Inpatient    1940 MRN [id number]   Location [facility] 1W Attending Joe Dixon MD   Hosp Day # 10 PCP Boubacar Buchanan MD       Reason f 0.5 inch, Topical, Q6H PRN  •  Enoxaparin Sodium (LOVENOX) 60 MG/0.6ML injection 50 mg, 0.6 mg/kg, Subcutaneous, 2 times per day  •  dextrose 10 % infusion, , Intravenous, Continuous PRN  •  Dexmedetomidine HCl in NaCl (PRECEDEX) 400 MCG/100ML premix infus 04/25/20  0523 04/26/20  0436 04/29/20  0511   * 136* 135*   BUN 36* 31* 26*   CREATSERUM 0.86 0.80 0.72   GFRAA 96 99 103   GFRNAA 83 86 89   CA 8.4* 8.2* 8.2*    146* 146*   K 4.6 4.4 4.2    112 111   CO2 26.0 29.0 32.0       Microbiol

## 2020-04-30 ENCOUNTER — ANESTHESIA (OUTPATIENT)
Dept: MEDSURG UNIT | Facility: HOSPITAL | Age: 80
DRG: 207 | End: 2020-04-30
Payer: MEDICARE

## 2020-04-30 ENCOUNTER — APPOINTMENT (OUTPATIENT)
Dept: PICC SERVICES | Facility: HOSPITAL | Age: 80
DRG: 207 | End: 2020-04-30
Attending: INTERNAL MEDICINE
Payer: MEDICARE

## 2020-04-30 ENCOUNTER — APPOINTMENT (OUTPATIENT)
Dept: GENERAL RADIOLOGY | Facility: HOSPITAL | Age: 80
DRG: 207 | End: 2020-04-30
Attending: INTERNAL MEDICINE
Payer: MEDICARE

## 2020-04-30 ENCOUNTER — ANESTHESIA EVENT (OUTPATIENT)
Dept: MEDSURG UNIT | Facility: HOSPITAL | Age: 80
DRG: 207 | End: 2020-04-30
Payer: MEDICARE

## 2020-04-30 ENCOUNTER — APPOINTMENT (OUTPATIENT)
Dept: CV DIAGNOSTICS | Facility: HOSPITAL | Age: 80
DRG: 207 | End: 2020-04-30
Attending: INTERNAL MEDICINE
Payer: MEDICARE

## 2020-04-30 PROCEDURE — 93308 TTE F-UP OR LMTD: CPT | Performed by: INTERNAL MEDICINE

## 2020-04-30 PROCEDURE — 0BH17EZ INSERTION OF ENDOTRACHEAL AIRWAY INTO TRACHEA, VIA NATURAL OR ARTIFICIAL OPENING: ICD-10-PCS | Performed by: HOSPITALIST

## 2020-04-30 PROCEDURE — 5A1955Z RESPIRATORY VENTILATION, GREATER THAN 96 CONSECUTIVE HOURS: ICD-10-PCS | Performed by: HOSPITALIST

## 2020-04-30 PROCEDURE — B5181ZA FLUOROSCOPY OF SUPERIOR VENA CAVA USING LOW OSMOLAR CONTRAST, GUIDANCE: ICD-10-PCS | Performed by: HOSPITALIST

## 2020-04-30 PROCEDURE — 31500 INSERT EMERGENCY AIRWAY: CPT | Performed by: ANESTHESIOLOGY

## 2020-04-30 PROCEDURE — 02HV33Z INSERTION OF INFUSION DEVICE INTO SUPERIOR VENA CAVA, PERCUTANEOUS APPROACH: ICD-10-PCS | Performed by: HOSPITALIST

## 2020-04-30 PROCEDURE — 99291 CRITICAL CARE FIRST HOUR: CPT | Performed by: INTERNAL MEDICINE

## 2020-04-30 PROCEDURE — 71045 X-RAY EXAM CHEST 1 VIEW: CPT | Performed by: INTERNAL MEDICINE

## 2020-04-30 RX ORDER — MINERAL OIL AND PETROLATUM 150; 830 MG/G; MG/G
OINTMENT OPHTHALMIC 2 TIMES DAILY
Status: DISCONTINUED | OUTPATIENT
Start: 2020-04-30 | End: 2020-05-07

## 2020-04-30 RX ORDER — POLYETHYLENE GLYCOL 3350 17 G/17G
17 POWDER, FOR SOLUTION ORAL DAILY PRN
Status: DISCONTINUED | OUTPATIENT
Start: 2020-04-30 | End: 2020-05-19

## 2020-04-30 RX ORDER — ASPIRIN 81 MG/1
81 TABLET, CHEWABLE ORAL DAILY
Status: DISCONTINUED | OUTPATIENT
Start: 2020-05-01 | End: 2020-05-01

## 2020-04-30 RX ORDER — SODIUM PHOSPHATE, DIBASIC AND SODIUM PHOSPHATE, MONOBASIC 7; 19 G/133ML; G/133ML
1 ENEMA RECTAL ONCE AS NEEDED
Status: DISCONTINUED | OUTPATIENT
Start: 2020-04-30 | End: 2020-05-19

## 2020-04-30 RX ORDER — BISACODYL 10 MG
10 SUPPOSITORY, RECTAL RECTAL
Status: DISCONTINUED | OUTPATIENT
Start: 2020-04-30 | End: 2020-05-19

## 2020-04-30 RX ORDER — CHLORHEXIDINE GLUCONATE 0.12 MG/ML
15 RINSE ORAL
Status: DISCONTINUED | OUTPATIENT
Start: 2020-04-30 | End: 2020-05-07

## 2020-04-30 RX ORDER — LIDOCAINE HYDROCHLORIDE 10 MG/ML
0.5 INJECTION, SOLUTION INFILTRATION; PERINEURAL ONCE AS NEEDED
Status: ACTIVE | OUTPATIENT
Start: 2020-04-30 | End: 2020-04-30

## 2020-04-30 RX ORDER — VANCOMYCIN HYDROCHLORIDE
15 EVERY 12 HOURS
Status: DISCONTINUED | OUTPATIENT
Start: 2020-05-01 | End: 2020-05-07

## 2020-04-30 RX ORDER — METHYLPREDNISOLONE SODIUM SUCCINATE 40 MG/ML
40 INJECTION, POWDER, LYOPHILIZED, FOR SOLUTION INTRAMUSCULAR; INTRAVENOUS EVERY 12 HOURS
Status: COMPLETED | OUTPATIENT
Start: 2020-04-30 | End: 2020-05-08

## 2020-04-30 RX ORDER — VANCOMYCIN 2 GRAM/500 ML IN 0.9 % SODIUM CHLORIDE INTRAVENOUS
25 ONCE
Status: COMPLETED | OUTPATIENT
Start: 2020-04-30 | End: 2020-04-30

## 2020-04-30 RX ORDER — SODIUM CHLORIDE 0.9 % (FLUSH) 0.9 %
10 SYRINGE (ML) INJECTION AS NEEDED
Status: DISCONTINUED | OUTPATIENT
Start: 2020-04-30 | End: 2020-05-19

## 2020-04-30 NOTE — PROGRESS NOTES
Subjective:   ABG and vent adjustments    Objective:   Vital Signs:  Blood pressure 104/77, pulse 107, temperature 98.5 °F (36.9 °C), temperature source Axillary, resp. rate (!) 29, height 5' 9\" (1.753 m), weight 174 lb 12.8 oz (79.3 kg), SpO2 95 %.     Ve

## 2020-04-30 NOTE — ANESTHESIA PREPROCEDURE EVALUATION
Anesthesia PreOp Note    HPI:     Salas Bloom is a 67year old male who presents for preoperative consultation requested by: * No surgeons listed *    Date of Surgery: 4/30/2020    * No procedures listed *  Indication: * No pre-op diagnosis entered * Continuous PRN, Alireza Morgan MD  docusate sodium (COLACE) liquid 100 mg, 100 mg, Oral, BID, Alireza Morgan MD  PEG 3350 (MIRALAX) powder packet 17 g, 17 g, Oral, Daily PRN, Alireza Morgan MD  magnesium hydroxide (MILK OF MAGNESIA) 400 MG/5ML suspensio 2 MG/ML injection 2 mg, 2 mg, Intravenous, Q2H PRN, Alfonso Srinivasan MD, 2 mg at 04/26/20 1659  LORazepam (ATIVAN) injection 1 mg, 1 mg, Intravenous, Q4H PRN, Alfonso Srinivasan MD  Pantoprazole Sodium (PROTONIX) 40 mg in Sodium Chloride 0.9 % 10 mL IV push, 4 Years:  6.00        Pack years: 3      Smokeless tobacco: Never Used    Substance and Sexual Activity      Alcohol use: Never        Frequency: Never      Drug use: Never      Sexual activity: Not on file    Lifestyle      Physical activity:        Days per 39   Temp:       TempSrc:       SpO2: (!) 87% (!) 87% (!) 85% (!) 85%   Weight:       Height:            Anesthesia Evaluation     Patient summary reviewed and Nursing notes reviewed    No history of anesthetic complications   Airway   Mallampati: II  Neck

## 2020-04-30 NOTE — RESPIRATORY THERAPY NOTE
Assisted in intubation of patient with Dr. Jim Levine. Used size 8.0 ET tube 22cm @ the lip. Secured tube with an ET tube patel. ETCO2 turned to yellow and patient placed on the vent.

## 2020-04-30 NOTE — PLAN OF CARE
Restraints applied post intubation.     Problem: Safety Risk - Non-Violent Restraints  Goal: Patient will remain free from self-harm  Description  INTERVENTIONS:  - Apply the least restrictive restraint to prevent harm  - Notify patient and family of reason

## 2020-04-30 NOTE — PROGRESS NOTES
[facility] ID PROGRESS NOTE    Marjorie Tabares Patient Status:  Inpatient    1940 MRN [id number]   Location [facility] 1W Attending Shannan Vega MD   Hosp Day # 6 PCP MD Melisa Krishnamurthy Persistent high oxygen requirement up to 90 to 100%. Most recent chest x-ray with diffuse bilateral pulmonary opacities that is unchanged. ABG with persistent hypoxia. Inflammatory markers improved. On high-dose enoxaparin with d-dimer greater than 20.

## 2020-04-30 NOTE — PROGRESS NOTES
Subjective:   Called by Dr. Sarbjit Rajput of his plans for elective intubation of patient after prolonged trial of both Bipap and Vapotherm.  Discussion with pt son per Dr. Sarbjit Rajput    Objective:   Vital Signs:  Blood pressure 118/62, pulse 107, temperature 97.8 °F (3

## 2020-04-30 NOTE — PROCEDURES
[place]audia. #5 [facility] Final Patient Status:  Inpatient    1940 MRN [id number]   Location [facility] 1W Attending Kin Bloom MD   Hosp Day # 6 PCP Boubacar Buchanan MD     Tanisha Stearns is a 67year old male patient. Procedure well tolerated by patient        Cortney Mcgee.  SHAR AT [facility]  4/30/2020

## 2020-04-30 NOTE — PROGRESS NOTES
Pulmonary/Critical Care Follow Up Note    HPI:   Dora Olson is a 67year old male with Patient presents with:  Dyspnea NEELAM SOB      PCP Boubacar Buchanan MD  Admission Attending Janell Blas 15 Day #11    C/o mouth discomfort Oral, Nightly PRN  •  bisacodyl (DULCOLAX) rectal suppository 10 mg, 10 mg, Rectal, Daily PRN  •  guaiFENesin-codeine (ROBITUSSIN AC) 100-10 MG/5ML syrup 5 mL, 5 mL, Oral, Q4H PRN      Allergies:  No Known Allergies        PHYSICAL EXAM:   Blood pressure 1 Christiano  No answer    Long discussion with son  He had difficulty understanding how he got this sick, why worse, how worse, understanding timeline  Also asking about drugs in news like remdesivir  I discussed all including attempt to share info re prognosi

## 2020-04-30 NOTE — PROGRESS NOTES
[facility] Dosing Service    Initial Pharmacokinetic Consult for Vancomycin Dosing     Bg Faith is a 67year old male who is being treated for pneumonia. Pharmacy has been asked to dose Vancomycin by Dr. Demetrio Mcdaniel    He has No Known Allergies.

## 2020-04-30 NOTE — PLAN OF CARE
- pt intubated today at approx noon. - Art line placed by CRNA  - restraints applied  - PICC line placed  - Levophed available if needed  - Son updated this evening.      Problem: Patient/Family Goals  Goal: Patient/Family Long Term Goal  Description  Latoya SAFETY ADULT - FALL  Goal: Free from fall injury  Description  INTERVENTIONS:  - Assess pt frequently for physical needs  - Identify cognitive and physical deficits and behaviors that affect risk of falls.   - Winston fall precautions as indicated by asse method for patient to ask for assistance (call light)  - Provide an  as needed  - Communicate barriers and strategies to overcome with those who interact with patient  Outcome: Progressing     Problem: RESPIRATORY - ADULT  Goal: Achieves optimal as ordered  Outcome: Progressing     Problem: GASTROINTESTINAL - ADULT  Goal: Minimal or absence of nausea and vomiting  Description  INTERVENTIONS:  - Maintain adequate hydration with IV or PO as ordered and tolerated  - Nasogastric tube to low intermitte

## 2020-04-30 NOTE — ANESTHESIA PROCEDURE NOTES
Airway  Urgency: emergent      General Information and Staff    Patient location during procedure: ICU  Anesthesiologist: Perla Banuelos MD  Performed: anesthesiologist     Indications and Patient Condition  Preoxygenated: yes  Patient position: sniffin

## 2020-04-30 NOTE — PLAN OF CARE
Pt remains on Bipap, desat's quickly with oral care. Attempted to place pt on vapotherm and NRB but pt desat's to 70's. His mouth and nose are severely dry with dried blood. Frequent oral care given. Precedex gtt to keep him calm. Villareal draining well.  Echo pt/family on patient safety including physical limitations  - Instruct pt to call for assistance with activity based on assessment  - Modify environment to reduce risk of injury  - Provide assistive devices as appropriate  - Consider OT/PT consult to suzie and hemodynamic stability  Description  INTERVENTIONS:  - Monitor vital signs, rhythm, and trends  - Monitor for bleeding, hypotension and signs of decreased cardiac output  - Evaluate effectiveness of vasoactive medications to optimize hemodynamic stabili (undernourished)  Description  INTERVENTIONS:  - Monitor percentage of each meal consumed  - Identify factors contributing to decreased intake, treat as appropriate  - Assist with meals as needed  - Monitor I&O, WT and lab values  - Obtain nutritional cons

## 2020-05-01 PROCEDURE — 99291 CRITICAL CARE FIRST HOUR: CPT | Performed by: INTERNAL MEDICINE

## 2020-05-01 RX ORDER — ASPIRIN 81 MG/1
324 TABLET, CHEWABLE ORAL DAILY
Status: DISCONTINUED | OUTPATIENT
Start: 2020-05-01 | End: 2020-05-02

## 2020-05-01 RX ORDER — ASPIRIN 325 MG
325 TABLET, DELAYED RELEASE (ENTERIC COATED) ORAL DAILY
Status: DISCONTINUED | OUTPATIENT
Start: 2020-05-01 | End: 2020-05-01

## 2020-05-01 RX ORDER — ENOXAPARIN SODIUM 100 MG/ML
1 INJECTION SUBCUTANEOUS EVERY 12 HOURS SCHEDULED
Status: DISCONTINUED | OUTPATIENT
Start: 2020-05-01 | End: 2020-05-02

## 2020-05-01 NOTE — DIETARY NOTE
ADULT NUTRITION REASSESSMENT     RECOMMENDATIONS TO MD:  See Nutrition Intervention for EN orders. Pt is at high nutrition risk.       NUTRITION DIAGNOSIS/PROBLEM:  Inadequate oral intake related to decreased ability to consume sufficient amount of wang 1,.2 with fiber to help in motility. Na & Osmolality starting to elevate, noting 1.7 Liter free water Deficit. Will slightly increase water flushes from 30 to 60 ml q 4 hrs. And will monitor and adjust as needed.  Per MD, COVID-19, c/b AHRF/ARDS, Currently 160         113% IBW       Usual Body Wt: 178# per son        100% UBW  WEIGHT HISTORY:   Wt Readings from Last 6 Encounters:  05/01/20 : 77.7 kg (171 lb 4.8 oz)    Patient Weight(s) for the past 336 hrs:   Weight   05/01/20 0000 77.7 kg (171 lb 4.8 oz) RD will follow up.      Chaneta Fabry [address], [address] [facility], C/ Colt Rosas

## 2020-05-01 NOTE — CM/SW NOTE
Chart review for LOS day 12. Pt with POSITIVE results for COVID-19 on 4/19. Pt required intubation yesterday 4/30, and remains on vent support at 85% with propofol and bilateral wrist restraints at this time. Pt is Full Code.  No advance directive pap

## 2020-05-01 NOTE — PROGRESS NOTES
[facility]    Cindy Gonsalez Patient Status:  Inpatient    1940 MRN [id number]   Location [facility] 1W Attending Rosalie Hawkins MD   Hosp Day # 12 PCP MD Flora Krishnamurthy 24.9 and remains on ceftriaxone. Persistent high oxygen requirement up to 90 to 100%. Most recent chest x-ray with diffuse bilateral pulmonary opacities that is unchanged. ABG with persistent hypoxia. Inflammatory markers improved.   On high-dose enoxap

## 2020-05-01 NOTE — PROGRESS NOTES
Pulmonary/Critical Care Follow Up Note    HPI:   Milind Desouza is a 67year old male with Patient presents with:  Dyspnea NEEALM SOB      PCP Boubacar Buchanan MD  Admission Attending Farida Coto, [facility] Day #12    C/o mouth discomfort Cefepime HCl (MAXIPIME) 1 g in sodium chloride 0.9% 100 mL MBP/add-vantage, 1 g, Intravenous, Q8H  •  vancomycin IVPB premix 1.25g in 0.9% NaCl 250 mL, 15 mg/kg, Intravenous, Q12H  •  methylPREDNISolone Sodium Succ (Solu-MEDROL) injection 40 mg, 40 mg, In temperature source Axillary, resp. rate (!) 27, height 5' 9.02\" (1.753 m), weight 171 lb 4.8 oz (77.7 kg), SpO2 96 %.     Intake/Output Summary (Last 24 hours) at 5/1/2020 0948  Last data filed at 5/1/2020 0500  Gross per 24 hour   Intake 2687.5 ml   Outpu

## 2020-05-01 NOTE — PLAN OF CARE
Pt received in bed responds to name able to follow commands. Fio2 85%. Et tube 8.0, ac 25 peep 12. Fio2 weaned down to 60%. Left radial aurora pulsatile and blood return noted. Triple lumen flushed with blood return. Propofol infusing at 60 mcg/hour.  Corinne Fonseca on pain and pain management  - Manage/alleviate anxiety  - Utilize distraction and/or relaxation techniques  - Monitor for opioid side effects  - Notify MD/LIP if interventions unsuccessful or patient reports new pain  - Anticipate increased pain with acti participate in their care  Description  Interventions:  - Assess communication ability and preferred communication style  - Implement communication aides and strategies  - Use visual cues when possible  - Listen attentively, be patient, do not interrupt  - Minimal or absence of nausea and vomiting  Description  INTERVENTIONS:  - Maintain adequate hydration with IV or PO as ordered and tolerated  - Nasogastric tube to low intermittent suction as ordered  - Evaluate effectiveness of ordered antiemetic medicati

## 2020-05-02 ENCOUNTER — APPOINTMENT (OUTPATIENT)
Dept: GENERAL RADIOLOGY | Facility: HOSPITAL | Age: 80
DRG: 207 | End: 2020-05-02
Attending: INTERNAL MEDICINE
Payer: MEDICARE

## 2020-05-02 PROCEDURE — 71045 X-RAY EXAM CHEST 1 VIEW: CPT | Performed by: INTERNAL MEDICINE

## 2020-05-02 PROCEDURE — 99291 CRITICAL CARE FIRST HOUR: CPT | Performed by: INTERNAL MEDICINE

## 2020-05-02 RX ORDER — ATORVASTATIN CALCIUM 10 MG/1
10 TABLET, FILM COATED ORAL NIGHTLY
Status: DISCONTINUED | OUTPATIENT
Start: 2020-05-02 | End: 2020-05-19

## 2020-05-02 RX ORDER — ASPIRIN 81 MG/1
81 TABLET, CHEWABLE ORAL DAILY
Status: DISCONTINUED | OUTPATIENT
Start: 2020-05-03 | End: 2020-05-19

## 2020-05-02 RX ORDER — ENOXAPARIN SODIUM 100 MG/ML
0.5 INJECTION SUBCUTANEOUS EVERY 12 HOURS SCHEDULED
Status: DISCONTINUED | OUTPATIENT
Start: 2020-05-02 | End: 2020-05-02

## 2020-05-02 RX ORDER — ENOXAPARIN SODIUM 100 MG/ML
40 INJECTION SUBCUTANEOUS DAILY
Status: DISCONTINUED | OUTPATIENT
Start: 2020-05-02 | End: 2020-05-03

## 2020-05-02 NOTE — PLAN OF CARE
Problem: PAIN - ADULT  Goal: Verbalizes/displays adequate comfort level or patient's stated pain goal  Description  INTERVENTIONS:  - Encourage pt to monitor pain and request assistance  - Assess pain using appropriate pain scale  - Administer analgesics overcome with those who interact with patient  Outcome: Progressing     Problem: RESPIRATORY - ADULT  Goal: Achieves optimal ventilation and oxygenation  Description  INTERVENTIONS:  - Assess for changes in respiratory status  - Assess for changes in menta vomiting  Description  INTERVENTIONS:  - Maintain adequate hydration with IV or PO as ordered and tolerated  - Nasogastric tube to low intermittent suction as ordered  - Evaluate effectiveness of ordered antiemetic medications  - Provide nonpharmacologic c to know as we care for you?  I speak [place]  - Provide timely, complete, and accurate information to patient/family  - Incorporate patient and family knowledge, values, beliefs, and cultural backgrounds into the planning and delivery of care  - Encourage p Progressing    Patient vented and sedated. On bilateral wrist restraints. Vent settings at AC25  PEEP +12 and 50% Fi)2. O2 sat 88% to 94%. . Suctioned with minimal secretions. Troponins elevated. Magalie Perea consulted. Started on Aspirin 324mg daily.  Tur

## 2020-05-02 NOTE — PLAN OF CARE
[facility] Plan of Care Note    Victoriano Garza Patient Status:  Inpatient    1940 MRN [id number]   Location [facility] 1W Attending Ryder Chavez MD   Hosp Day # 15 PCP Boubacar Buchanan MD     67year old male, consult stable, no need for repeat echo  • No diuresis for now, but reassess after exam in the AM.  If central line or PICC line placed, transduce CVP for invasive hemodynamic monitoring. [place] 42.   [facility]

## 2020-05-02 NOTE — RESPIRATORY THERAPY NOTE
Pt remained on following vent settings overnight: VC 25/550/+12/50%. Suction provided as indicated. RT to continue to monitor.

## 2020-05-02 NOTE — PLAN OF CARE
Bilateral wrist restraints in place to prevent patient from pulling at IV lines/tubes.      Problem: Safety Risk - Non-Violent Restraints  Goal: Patient will remain free from self-harm  Description  INTERVENTIONS:  - Apply the least restrictive restraint to

## 2020-05-02 NOTE — PROGRESS NOTES
120 [facility] dosing service    Follow-up Pharmacokinetic Consult for Vancomycin Dosing     Rody Barrgaan is a 67year old male who is being treated for pneumonia. Patient is on day 3 of Vancomycin and is currently receiving 1.25 gm IV Q 12 hours.   Goal

## 2020-05-02 NOTE — PROGRESS NOTES
[facility] HOSP - [facility]    Cardiology - [facility]  Progress Note    Bobbi Menezes Patient Status:  Inpatient    1940 MRN [id number]   Location [facility] 1W Attending Alfonso Srinivasan MD   Hosp Day # 15 PCP Boubacar Buchanan MD masses  Lungs - mechanically ventilated via ETT  CV - no precordial heave, no edema  Abd - nondistended  Ext - no cyanosis  Skin - no rash  Neuro - unresponsive, sedated    Results:     Lab Results   Component Value Date    WBC 19.6 (H) 05/02/2020    HGB 1 rhythm    ------------------------------------------------------------------------------------------------------------------------------

## 2020-05-02 NOTE — PROGRESS NOTES
Pulmonary/Critical Care Follow Up Note    HPI:   Fidel Winters is a 67year old male with Patient presents with:  Dyspnea NEELAM SOB      PCP Boubacar Buchanan MD  Admission Attending Kayla Barragan [facility] Day #13    Wakes up off sedation methylPREDNISolone Sodium Succ (Solu-MEDROL) injection 40 mg, 40 mg, Intravenous, Q12H  •  Normal Saline Flush 0.9 % injection 10 mL, 10 mL, Intravenous, PRN  •  norepinephrine (LEVOPHED) 4 mg/250 ml premix infusion, 0.5-30 mcg/min, Intravenous, Continuous 05/02/2020    BUN 39 05/02/2020     05/02/2020    K 5.2 05/02/2020     05/02/2020    CO2 33.0 05/02/2020     05/02/2020    CA 8.3 05/02/2020    DDIMER >20.00 05/02/2020    CRP 1.63 05/02/2020    TROP 0.198 05/01/2020     Lab Results   Co

## 2020-05-02 NOTE — PLAN OF CARE
Patient intubated and sedated, but follows commands. Pt requiring frequent suctioning. Lovenox BID. Continue IV ABX. Turn q2- skin precautions.    Problem: Patient/Family Goals  Goal: Patient/Family Long Term Goal  Description  Patient's Long Term Goal: To ADULT - FALL  Goal: Free from fall injury  Description  INTERVENTIONS:  - Assess pt frequently for physical needs  - Identify cognitive and physical deficits and behaviors that affect risk of falls. - [place] fall precautions as indicated by assessment. for patient to ask for assistance (call light)  - Provide an  as needed  - Communicate barriers and strategies to overcome with those who interact with patient  Outcome: Progressing     Problem: RESPIRATORY - ADULT  Goal: Achieves optimal ventil ordered  Outcome: Progressing     Problem: GASTROINTESTINAL - ADULT  Goal: Minimal or absence of nausea and vomiting  Description  INTERVENTIONS:  - Maintain adequate hydration with IV or PO as ordered and tolerated  - Nasogastric tube to low intermittent needs   - Reorient and redirection as needed  - Assess for the need to continue restraints  Outcome: Progressing

## 2020-05-03 ENCOUNTER — APPOINTMENT (OUTPATIENT)
Dept: GENERAL RADIOLOGY | Facility: HOSPITAL | Age: 80
DRG: 207 | End: 2020-05-03
Attending: INTERNAL MEDICINE
Payer: MEDICARE

## 2020-05-03 PROCEDURE — 99233 SBSQ HOSP IP/OBS HIGH 50: CPT | Performed by: INTERNAL MEDICINE

## 2020-05-03 PROCEDURE — 71045 X-RAY EXAM CHEST 1 VIEW: CPT | Performed by: INTERNAL MEDICINE

## 2020-05-03 RX ORDER — ENOXAPARIN SODIUM 100 MG/ML
40 INJECTION SUBCUTANEOUS EVERY 12 HOURS SCHEDULED
Status: DISCONTINUED | OUTPATIENT
Start: 2020-05-03 | End: 2020-05-04

## 2020-05-03 NOTE — PLAN OF CARE
Patient remains on bilateral wrist restraint to prevent from pulling ETT/lines.     Problem: Safety Risk - Non-Violent Restraints  Goal: Patient will remain free from self-harm  Description  INTERVENTIONS:  - Apply the least restrictive restraint to prevent

## 2020-05-03 NOTE — PLAN OF CARE
Problem: Patient/Family Goals  Goal: Patient/Family Long Term Goal  Description  Patient's Long Term Goal: To go home    Interventions:    - See additional Care Plan goals for specific interventions  Outcome: Progressing  Goal: Patient/Family Short Term behaviors that affect risk of falls.   - [place] fall precautions as indicated by assessment.  - Educate pt/family on patient safety including physical limitations  - Instruct pt to call for assistance with activity based on assessment  - Modify environme patient  Outcome: Progressing     Problem: RESPIRATORY - ADULT  Goal: Achieves optimal ventilation and oxygenation  Description  INTERVENTIONS:  - Assess for changes in respiratory status  - Assess for changes in mentation and behavior  - Position to facil hydration with IV or PO as ordered and tolerated  - Nasogastric tube to low intermittent suction as ordered  - Evaluate effectiveness of ordered antiemetic medications  - Provide nonpharmacologic comfort measures as appropriate  - Advance diet as tolerated

## 2020-05-03 NOTE — RESPIRATORY THERAPY NOTE
Pt on current vent settings 22/550/+8/50% ; ETT 8.0 @ 22cm. Suctioned pt as needed throughout the night. Pt tolerating and saturating appropriately. RT to continue to monitor.

## 2020-05-03 NOTE — PROGRESS NOTES
[facility]    Progress Note      Assessment and Plan:   1. Novel coronavirus infection– the patient has completed azithromycin, Plaquenil and Actemra. He is currently on ventilator. His x-ray still shows diffuse modest infiltrates.     Re ajay Wetzel MD  Medical Director, Critical Care, [facility]  Medical Director, [facility]  Pager: [phone]

## 2020-05-03 NOTE — PLAN OF CARE
Problem: Patient/Family Goals  Goal: Patient/Family Long Term Goal  Description  Patient's Long Term Goal: To go home    Interventions:    - See additional Care Plan goals for specific interventions  Outcome: Not Progressing  Goal: Patient/Family Short T cognitive and physical deficits and behaviors that affect risk of falls.   - [place] fall precautions as indicated by assessment.  - Educate pt/family on patient safety including physical limitations  - Instruct pt to call for assistance with activity bas strategies to overcome with those who interact with patient  Outcome: Not Progressing     Problem: RESPIRATORY - ADULT  Goal: Achieves optimal ventilation and oxygenation  Description  INTERVENTIONS:  - Assess for changes in respiratory status  - Assess fo nausea and vomiting  Description  INTERVENTIONS:  - Maintain adequate hydration with IV or PO as ordered and tolerated  - Nasogastric tube to low intermittent suction as ordered  - Evaluate effectiveness of ordered antiemetic medications  - Provide nonphar restraints  Outcome: Not Progressing   Remains on ventilator  Suctioning and turn q2  Restraints for tube safety

## 2020-05-04 PROCEDURE — 99291 CRITICAL CARE FIRST HOUR: CPT | Performed by: INTERNAL MEDICINE

## 2020-05-04 RX ORDER — ENOXAPARIN SODIUM 100 MG/ML
40 INJECTION SUBCUTANEOUS DAILY
Status: DISCONTINUED | OUTPATIENT
Start: 2020-05-05 | End: 2020-05-04

## 2020-05-04 RX ORDER — ENOXAPARIN SODIUM 100 MG/ML
40 INJECTION SUBCUTANEOUS EVERY 12 HOURS SCHEDULED
Status: DISCONTINUED | OUTPATIENT
Start: 2020-05-04 | End: 2020-05-19

## 2020-05-04 NOTE — PLAN OF CARE
Problem: RESPIRATORY - ADULT  Goal: Achieves optimal ventilation and oxygenation  Description  INTERVENTIONS:  - Assess for changes in respiratory status  - Assess for changes in mentation and behavior  - Position to facilitate oxygenation and minimize r Sedation holiday (time) 1135   Spontaneous RR Rate 12   Spontaneous Minute Volume 8.6   Average Spontaneous Tidal Volume 660   Negative Inspiratory Force -25   Total RSBI 18

## 2020-05-04 NOTE — PROGRESS NOTES
Pulmonary/Critical Care Follow Up Note    HPI:   Marci Adams is a 67year old male with Patient presents with:  Dyspnea NEELAM SOB      PCP Boubacar Buchanan MD  Admission Attending Luz Moody [facility] Day #15    Wakes up off sedation Sodium Succ (Solu-MEDROL) injection 40 mg, 40 mg, Intravenous, Q12H  •  Normal Saline Flush 0.9 % injection 10 mL, 10 mL, Intravenous, PRN  •  norepinephrine (LEVOPHED) 4 mg/250 ml premix infusion, 0.5-30 mcg/min, Intravenous, Continuous PRN  •  nitroGLYCE 4/27 4 quad airspace dz   5/2 clearing lungs           ASSESSMENT/PLAN:     COVID-19  c/b AHRF/ARDS  S/p failed trial of helmet ventilation and failed NIV/vapotherm  Intubated 4/30  Pplat can not measure  FiO2 0.45 and PEEP 5 with sat 90%  FiO2 S/p PQ a

## 2020-05-04 NOTE — PAYOR COMM NOTE
--------------  CONTINUED STAY REVIEW    Idris Marion MA [facility]  Subscriber #:  [id number]  Authorization Number: [id number]      Admit date: 4/19/20  Admit time: 2200    Admitting Physician: Daphne Thomson MD  Attending Physician:  Jd Zurita MD  [facility] 5/4/2020 0741 New Bag 100 mcg/hr Intravenous Devaughn Lomeli RN    5/4/2020 8160 Hi-Risk Rate/Dose Verify 100 mcg/hr Intravenous Roe Ash RN    5/3/2020 2200 Hi-Risk Rate/Dose Verify 100 mcg/hr Intravenous Roe Ash RN    5/3/2020 2154 Ne 5/3/2020 1600 New Bag 60 mcg/kg/min × 73.7 kg (Dosing Weight) Intravenous Evone Ridsukhwinder, RN    5/3/2020 1414 New Bag 60 mcg/kg/min × 73.7 kg (Dosing Weight) Intravenous Evone Ridsukhwinder, RN    5/3/2020 1300 Rate/Dose Verify 60 mcg/kg/min × 73.7 kg (Dosing Physical Exam sedate white male  HEENT examination is unremarkable with pupils equal round and reactive to light and accommodation. Neck without adenopathy, thyromegaly, JVD nor bruit. Lungs scattered crackles to auscultation and percussion.   Cardiac r

## 2020-05-04 NOTE — PLAN OF CARE
Patient remains vented and sedated. On AC 14./Fio2 45% and PEEP +8. O2 sat 90% to 95%. Has minimal secretions. Tolerating tube feeding. Turned and repositioned. On propofol and fentanyl drip.     Problem: PAIN - ADULT  Goal: Verbalizes/displays ad handout, if applicable  - Encourage broncho-pulmonary hygiene including cough, deep breathe, Incentive Spirometry  - Assess the need for suctioning and perform as needed  - Assess and instruct to report SOB or any respiratory difficulty  - Respiratory Ther Patient/Family Long Term Goal  Description  Patient's Long Term Goal: To go home    Interventions:    - See additional Care Plan goals for specific interventions  Outcome: Not Progressing  Goal: Patient/Family Short Term Goal  Description  Patient's Short Barrier  Goal: Patient/Family is able to understand and participate in their care  Description  Interventions:  - Assess communication ability and preferred communication style  - Implement communication aides and strategies  - Use visual cues when possibl

## 2020-05-04 NOTE — PROGRESS NOTES
[facility] PROGRESS NOTE    Jey Bradford Patient Status:  Inpatient    1940 MRN [id number]   Location [facility] 1W Attending Manav Goode MD   Hosp Day # 15 PCP MD Alice Krishnamurthy ceftriaxone. Persistent high oxygen requirement up to 90 to 100%. Most recent chest x-ray with diffuse bilateral pulmonary opacities that is unchanged. ABG with persistent hypoxia. Inflammatory markers improved.   On high-dose enoxaparin with d-dimer gr

## 2020-05-05 ENCOUNTER — APPOINTMENT (OUTPATIENT)
Dept: GENERAL RADIOLOGY | Facility: HOSPITAL | Age: 80
DRG: 207 | End: 2020-05-05
Attending: INTERNAL MEDICINE
Payer: MEDICARE

## 2020-05-05 PROCEDURE — 99291 CRITICAL CARE FIRST HOUR: CPT | Performed by: INTERNAL MEDICINE

## 2020-05-05 PROCEDURE — 71045 X-RAY EXAM CHEST 1 VIEW: CPT | Performed by: INTERNAL MEDICINE

## 2020-05-05 RX ORDER — DEXMEDETOMIDINE HYDROCHLORIDE 4 UG/ML
INJECTION, SOLUTION INTRAVENOUS CONTINUOUS
Status: DISCONTINUED | OUTPATIENT
Start: 2020-05-05 | End: 2020-05-08

## 2020-05-05 NOTE — RESPIRATORY THERAPY NOTE
Pt remained on following vent settings overnight: VC 18/550/+5/45%. Suction provided as indicated. RT to continue to monitor.

## 2020-05-05 NOTE — DIETARY NOTE
ADULT NUTRITION REASSESSMENT     RECOMMENDATIONS TO MD:  See Nutrition Intervention for changes in EN orders. Pt is at high nutrition risk.       NUTRITION DIAGNOSIS/PROBLEM:  Inadequate oral intake related to decreased ability to consume sufficient am fiber to help in motility. Na & Osmolality starting to elevate, noting 1.7 Liter free water Deficit. Will slightly increase water flushes from 30 to 60 ml q 4 hrs. And will monitor and adjust as needed.  Per MD, COVID-19, c/b AHRF/ARDS, Currently managed w providers    ANTHROPOMETRICS:  HT: 175.3 cm (5' 9\")       WT: 79.7 kg (175 lb 12.8 oz)    --wt appears stable. Admit wt 180# ; dosing wt 81 kg or 178# (per son's report wt of 178#)  BMI: Body mass index is 25.95 kg/m².          BMI Classification: 25-29 kg Actual body wt (ABW))   Protein:  g protein/day (1.2-1.3 g protein/kg  Actual body wt (ABW))   Fluids: ~ 2000  ml /day (25 ml/kg )    MONITOR AND EVALUATE/NUTRITION GOALS:  - Food and Nutrient Intake:   Monitor: for PO initiation when safe.   Petty Pop

## 2020-05-05 NOTE — PROGRESS NOTES
Pulmonary/Critical Care Follow Up Note    HPI:   Jordan Saint is a 67year old male with Patient presents with:  Dyspnea NEELAM SOB      PCP Boubacar Buchanan MD  Admission Attending Janell Xie 15 Day #16    Wakes up off sedation mcg/kg/min (Dosing Weight), Intravenous, Continuous  •  artificial tears 83-15 % ophthalmic ointment, , Both Eyes, BID  •  vancomycin IVPB premix 1.25g in 0.9% NaCl 250 mL, 15 mg/kg, Intravenous, Q12H  •  methylPREDNISolone Sodium Succ (Solu-MEDROL) inject Abd soft + BS  Ext no edema      LABS:    Lab Results   Component Value Date    WBC 14.0 05/05/2020    HGB 11.9 05/05/2020    HCT 37.4 05/05/2020    .0 05/05/2020    CREATSERUM 0.55 05/05/2020    BUN 29 05/05/2020     05/05/2020    K 4.5 05/

## 2020-05-06 ENCOUNTER — APPOINTMENT (OUTPATIENT)
Dept: GENERAL RADIOLOGY | Facility: HOSPITAL | Age: 80
DRG: 207 | End: 2020-05-06
Attending: INTERNAL MEDICINE
Payer: MEDICARE

## 2020-05-06 PROCEDURE — 71045 X-RAY EXAM CHEST 1 VIEW: CPT | Performed by: INTERNAL MEDICINE

## 2020-05-06 PROCEDURE — 99291 CRITICAL CARE FIRST HOUR: CPT | Performed by: INTERNAL MEDICINE

## 2020-05-06 RX ORDER — ALBUTEROL SULFATE 90 UG/1
4 AEROSOL, METERED RESPIRATORY (INHALATION)
Status: DISCONTINUED | OUTPATIENT
Start: 2020-05-06 | End: 2020-05-08

## 2020-05-06 RX ORDER — POLYETHYLENE GLYCOL 3350 17 G/17G
17 POWDER, FOR SOLUTION ORAL DAILY
Status: DISCONTINUED | OUTPATIENT
Start: 2020-05-06 | End: 2020-05-19

## 2020-05-06 RX ORDER — SODIUM CHLORIDE, SODIUM LACTATE, POTASSIUM CHLORIDE, CALCIUM CHLORIDE 600; 310; 30; 20 MG/100ML; MG/100ML; MG/100ML; MG/100ML
INJECTION, SOLUTION INTRAVENOUS ONCE
Status: COMPLETED | OUTPATIENT
Start: 2020-05-06 | End: 2020-05-06

## 2020-05-06 RX ORDER — SODIUM CHLORIDE, SODIUM LACTATE, POTASSIUM CHLORIDE, CALCIUM CHLORIDE 600; 310; 30; 20 MG/100ML; MG/100ML; MG/100ML; MG/100ML
500 INJECTION, SOLUTION INTRAVENOUS ONCE
Status: COMPLETED | OUTPATIENT
Start: 2020-05-06 | End: 2020-05-06

## 2020-05-06 NOTE — PROGRESS NOTES
This AM pt weaned off propofol and restarted on precedex. Was alert and calm and following commands. Dr. Meli Juan attempted SBT with RN at bedside.  Pt initially tolerating well but SBP natan to 190s, HR 110s, and tachypneic RR 30s labored with accessory muscle

## 2020-05-06 NOTE — PLAN OF CARE
Patient remains intubated and sedated. Off sedation follows commands, FREITAS, nods yes/no. SBT this AM with Dr. Aubrey Yu at bedside; see previous note.    Following SBT had hypotension BPs dropping as low as 92P systolic; given LR 629IL bolus x2 and improved, S social influences on pain and pain management  - Manage/alleviate anxiety  - Utilize distraction and/or relaxation techniques  - Monitor for opioid side effects  - Notify MD/LIP if interventions unsuccessful or patient reports new pain  - Anticipate increa understand and participate in their care  Description  Interventions:  - Assess communication ability and preferred communication style  - Implement communication aides and strategies  - Use visual cues when possible  - Listen attentively, be patient, do n arrhythmias or at baseline  Description  INTERVENTIONS:  - Continuous cardiac monitoring, monitor vital signs, obtain 12 lead EKG if indicated  - Evaluate effectiveness of antiarrhythmic and heart rate control medications as ordered  - Initiate emergency m restraint to prevent harm  - Notify patient and family of reasons restraints applied  - Assess for any contributing factors to confusion (electrolyte disturbances, delirium, medications)  - Discontinue any unnecessary medical devices as soon as possible  -

## 2020-05-06 NOTE — PROGRESS NOTES
[place] [facility]    Colten Vazquez Patient Status:  Inpatient    1940 MRN [id number]   Location [facility] 1W Attending Aleksandra Ba MD   Hosp Day # 16 PCP MD Karla Krishnamurthy [facility] ceftriaxone. Persistent high oxygen requirement up to 90 to 100%. Most recent chest x-ray with diffuse bilateral pulmonary opacities that is unchanged. ABG with persistent hypoxia. Inflammatory markers improved.   On high-dose enoxaparin with d-dimer gr

## 2020-05-06 NOTE — PLAN OF CARE
Problem: Patient/Family Goals  Goal: Patient/Family Long Term Goal  Description  Patient's Long Term Goal: To go home    Interventions:    - See additional Care Plan goals for specific interventions  Outcome: Not Progressing  Goal: Patient/Family Short T cognitive and physical deficits and behaviors that affect risk of falls.   - [place] fall precautions as indicated by assessment.  - Educate pt/family on patient safety including physical limitations  - Instruct pt to call for assistance with activity bas strategies to overcome with those who interact with patient  Outcome: Not Progressing     Problem: RESPIRATORY - ADULT  Goal: Achieves optimal ventilation and oxygenation  Description  INTERVENTIONS:  - Assess for changes in respiratory status  - Assess fo nausea and vomiting  Description  INTERVENTIONS:  - Maintain adequate hydration with IV or PO as ordered and tolerated  - Nasogastric tube to low intermittent suction as ordered  - Evaluate effectiveness of ordered antiemetic medications  - Provide nonphar work done. Episode of restlessness, tachypnia, increased BP. Sedation increased together with fentanyl, hydralazine given. Episode subsided at 0500. Will continue to monitor.

## 2020-05-06 NOTE — PROGRESS NOTES
Pulmonary/Critical Care Follow Up Note    HPI:   Ulysses Daily is a 67year old male with Patient presents with:  Dyspnea NEELAM SOB      PCP Boubacar Buchanan MD  Admission Attending Kanwal Marquez, [facility] Day #17    No events   Off sedat Gluconate (PERIDEX) 0.12 % solution 15 mL, 15 mL, Mouth/Throat, [email or web address]  •  propofol (DIPRIVAN) infusion, 5-100 mcg/kg/min (Dosing Weight), Intravenous, Continuous  •  artificial tears 83-15 % ophthalmic ointment, , Both Eyes, BID  •  vancomycin IVPB Output 3220 ml   Net 719.5 ml     Intubated and sedated  Following commands  Lung course BS  CV reg   Abd soft + BS  Ext no edema      LABS:    Lab Results   Component Value Date    WBC 20.1 05/06/2020    HGB 12.8 05/06/2020    HCT 40.7 05/06/2020    PLT

## 2020-05-07 ENCOUNTER — APPOINTMENT (OUTPATIENT)
Dept: GENERAL RADIOLOGY | Facility: HOSPITAL | Age: 80
DRG: 207 | End: 2020-05-07
Attending: INTERNAL MEDICINE
Payer: MEDICARE

## 2020-05-07 PROCEDURE — 99291 CRITICAL CARE FIRST HOUR: CPT | Performed by: INTERNAL MEDICINE

## 2020-05-07 PROCEDURE — 71045 X-RAY EXAM CHEST 1 VIEW: CPT | Performed by: INTERNAL MEDICINE

## 2020-05-07 RX ORDER — POTASSIUM CHLORIDE 1.5 G/1.77G
40 POWDER, FOR SOLUTION ORAL ONCE
Status: COMPLETED | OUTPATIENT
Start: 2020-05-07 | End: 2020-05-07

## 2020-05-07 RX ORDER — ISOSORBIDE DINITRATE 10 MG/1
5 TABLET ORAL
Status: DISCONTINUED | OUTPATIENT
Start: 2020-05-07 | End: 2020-05-19

## 2020-05-07 NOTE — PLAN OF CARE
Problem: Patient/Family Goals  Goal: Patient/Family Long Term Goal  Description  Patient's Long Term Goal: To go home    Interventions:    - See additional Care Plan goals for specific interventions  Outcome: Not Progressing  Note:   Pt remains vented gtt infusing     Problem: SAFETY ADULT - FALL  Goal: Free from fall injury  Description  INTERVENTIONS:  - Assess pt frequently for physical needs  - Identify cognitive and physical deficits and behaviors that affect risk of falls.   - [place] fall precau understanding and response  - Establish method for patient to ask for assistance (call light)  - Provide an  as needed  - Communicate barriers and strategies to overcome with those who interact with patient  Outcome: Progressing     Problem: RES arrhythmias  - Monitor electrolytes and administer replacement therapy as ordered  Outcome: Progressing     Problem: GASTROINTESTINAL - ADULT  Goal: Minimal or absence of nausea and vomiting  Description  INTERVENTIONS:  - Maintain adequate hydration with as possible  - Assess the patient's physical comfort, circulation, skin condition, hydration, nutrition and elimination needs   - Reorient and redirection as needed  - Assess for the need to continue restraints  Outcome: Progressing  Note:   No restraint h

## 2020-05-07 NOTE — PROGRESS NOTES
Pt extubated per Dr. Dorinda Carter order at [phone] this morning after ~45 minutes SBT. Placed on 6L HFNC. RR initially 35 but decreased to 20s, SpO2 92-93%. Call pt's son from the room and they spoke, pt primary language is [facility].  Through son's translation l

## 2020-05-07 NOTE — PROGRESS NOTES
Pulmonary/Critical Care Follow Up Note    HPI:   Colten Vazquez is a 67year old male with Patient presents with:  Dyspnea NEELAM SOB      PCP Boubacar Buchanan MD  Admission Attending Janell Webber 15 Day #18    No events   Off sedat bisacodyl (DULCOLAX) rectal suppository 10 mg, 10 mg, Rectal, Daily PRN  •  Fleet Enema (FLEET) 7-19 GM/118ML enema 133 mL, 1 enema, Rectal, Once PRN  •  Chlorhexidine Gluconate (PERIDEX) 0.12 % solution 15 mL, 15 mL, Mouth/Throat, [email or web address]  •  propof 183 lb 3.2 oz (83.1 kg), SpO2 96 %.     Intake/Output Summary (Last 24 hours) at 5/7/2020 0998  Last data filed at 5/7/2020 0500  Gross per 24 hour   Intake 3980 ml   Output 1650 ml   Net 2330 ml     Intubated and sedated  Following commands  Lung course BS

## 2020-05-07 NOTE — RESPIRATORY THERAPY NOTE
Patient received intubated and on ventilator. Placed on CPAP/PS 5/5 at 09:10 today, patient breathing 35-40 bpm, proceeded with extubation at 10:05, tolerated well and placed on 6 L high flow nasal cannula.  Patient able to slow his respiratory rate post ex

## 2020-05-07 NOTE — PLAN OF CARE
Pt extubated, on 6-8L HFNC. Slightly tachypneic in the 20s, improved with PRN morphine. SpO2 high 90s. AOx3, denies pain. Bathed today, turned Q2H. Hydralazine PRN given x2 for SBP >160; also started on isosorbide.      Problem: Patient/Family Goals  Goal and pre-medicate as appropriate  Outcome: Progressing     Problem: SAFETY ADULT - FALL  Goal: Free from fall injury  Description  INTERVENTIONS:  - Assess pt frequently for physical needs  - Identify cognitive and physical deficits and behaviors that affec distractions  - Allow time for understanding and response  - Establish method for patient to ask for assistance (call light)  - Provide an  as needed  - Communicate barriers and strategies to overcome with those who interact with patient  Outcom arrhythmias  - Monitor electrolytes and administer replacement therapy as ordered  Outcome: Progressing     Problem: GASTROINTESTINAL - ADULT  Goal: Minimal or absence of nausea and vomiting  Description  INTERVENTIONS:  - Maintain adequate hydration with routines  Outcome: Progressing

## 2020-05-08 ENCOUNTER — APPOINTMENT (OUTPATIENT)
Dept: GENERAL RADIOLOGY | Facility: HOSPITAL | Age: 80
DRG: 207 | End: 2020-05-08
Attending: INTERNAL MEDICINE
Payer: MEDICARE

## 2020-05-08 PROCEDURE — 99233 SBSQ HOSP IP/OBS HIGH 50: CPT | Performed by: INTERNAL MEDICINE

## 2020-05-08 PROCEDURE — 74018 RADEX ABDOMEN 1 VIEW: CPT | Performed by: INTERNAL MEDICINE

## 2020-05-08 PROCEDURE — 71045 X-RAY EXAM CHEST 1 VIEW: CPT | Performed by: INTERNAL MEDICINE

## 2020-05-08 RX ORDER — ALBUTEROL SULFATE 90 UG/1
2 AEROSOL, METERED RESPIRATORY (INHALATION) 4 TIMES DAILY
Status: DISCONTINUED | OUTPATIENT
Start: 2020-05-08 | End: 2020-05-11

## 2020-05-08 RX ORDER — SODIUM CHLORIDE 9 MG/ML
INJECTION, SOLUTION INTRAVENOUS CONTINUOUS
Status: DISCONTINUED | OUTPATIENT
Start: 2020-05-08 | End: 2020-05-08

## 2020-05-08 RX ORDER — PREDNISONE 20 MG/1
40 TABLET ORAL
Status: DISCONTINUED | OUTPATIENT
Start: 2020-05-09 | End: 2020-05-11

## 2020-05-08 RX ORDER — DEXTROSE AND SODIUM CHLORIDE 5; .9 G/100ML; G/100ML
INJECTION, SOLUTION INTRAVENOUS CONTINUOUS
Status: DISCONTINUED | OUTPATIENT
Start: 2020-05-08 | End: 2020-05-12

## 2020-05-08 NOTE — PROGRESS NOTES
Pulmonary/Critical Care Follow Up Note    HPI:   Maryann Wahl is a 67year old male with Patient presents with:  Dyspnea NEELAM SOB      PCP Boubacar Buchanan MD  Admission Attending Janell Walter 15 Day #19    Extubated  N/V this A 0.5-30 mcg/min, Intravenous, Continuous PRN  •  nitroGLYCERIN (NITRO-BID 2 % TD OINT) 2 % ointment 0.5 inch, 0.5 inch, Topical, Q6H PRN  •  dextrose 10 % infusion, , Intravenous, Continuous PRN  •  morphINE sulfate (PF) 2 MG/ML injection 2 mg, 2 mg, Geryl Hunt WBC 20.1 (H) 05/06/2020    WBC 14.0 (H) 05/05/2020    WBC 15.4 (H) 05/03/2020         CXR 4/23 inc    4/27 4 quad airspace dz   5/2 clearing lungs   5/7  Stable    5/8 mild improvement           ASSESSMENT/PLAN:     COVID-19  c/b AHRF/ARDS  S/p failed t

## 2020-05-08 NOTE — SLP NOTE
BSE orders received. Pt S/P extubation 5/07/20. Per RN, Pt not appropriate for BSE at this time d/t lethargy and emesis from tube feedings. Will f/u on 5/09/20 as appropriate. Collaborated with RN regarding Pt's swallowing plan of care.         Kar AMEZQUITA

## 2020-05-08 NOTE — PROGRESS NOTES
[place] [facility] - [facility] PROGRESS NOTE    Marjorie Tabares Patient Status:  Inpatient    1940 MRN [id number]   Location [facility] 1W Attending Shannan Vega MD   Hosp Day # 23 PCP MD Melisa Krishnamurthy leukocytosis up to 24.9 and remains on ceftriaxone. Persistent high oxygen requirement up to 90 to 100%. Most recent chest x-ray with diffuse bilateral pulmonary opacities that is unchanged. ABG with persistent hypoxia. Inflammatory markers improved.

## 2020-05-08 NOTE — DIETARY NOTE
ADULT NUTRITION REASSESSMENT     RECOMMENDATIONS TO MD:  AMAN result noted. Will resume TF tomorrow AM starting at low rate, See Nutrition intervention for TF orders. Discontinue IV fluids when TF resumed. Pt is at high nutrition risk.       NUTRITION D is hungry. Will increase TF at final goal rate to optimize nutrition and change formula to Jevity 1,.2 with fiber to help in motility. Na & Osmolality starting to elevate, noting 1.7 Liter free water Deficit.  Will slightly increase water flushes from 30 t Protein/kg   Met 100 % of kcal & 92 % of Protein needs and or >100 % RDIs)  · Obtained Mg and P for tomorrow, per protocol.    · Above orders discussed with RN.   - Meals and snacks: NPO  - Medical Food or Oral Nutrition Supplements (ONS) -RD discontinued E 05/06/20  0504 05/07/20  0404 05/08/20  0434   * 115* 129*   BUN 34* 28* 23*   CREATSERUM 0.55* 0.43* 0.53*   CA 8.4* 8.0* 8.3*   MG  --   --  2.7*    141 139   K 4.4 3.8 4.5    106 103   CO2 33.0* 33.0* 33.0*   PHOS  --   --  2.8   OSMO

## 2020-05-08 NOTE — CM/SW NOTE
The pt. Was extubated yesterday 5/7 and is currently on high flow O2.  PT/OT to work with the pt. When he is medically stable to do so. SW/CM will continue to follow and assist with discharge planning.       Risa Curiel, [facility] ext [phone]

## 2020-05-08 NOTE — PLAN OF CARE
Pt alert. Reports no pain. Is primarily [place] speaking but is able to make needs known. Experienced some nausea and dry heaving. Prn zofran given and feeding held. Dobhoff patent and in place.   Auscultated gurgling sound over epigastric area during oxygenation  Description  INTERVENTIONS:  - Assess for changes in respiratory status  - Assess for changes in mentation and behavior  - Position to facilitate oxygenation and minimize respiratory effort  - Oxygen supplementation based on oxygen saturation ordered  - Evaluate effectiveness of ordered antiemetic medications  - Provide nonpharmacologic comfort measures as appropriate  - Advance diet as tolerated, if ordered  - Obtain nutritional consult as needed  - Evaluate fluid balance  Outcome: Not Progres

## 2020-05-08 NOTE — PLAN OF CARE
Drowsiness, nausea/vomitting this morning- tube feeding held, on hold until tomorrow morning per MD and dietary. Abdominal XR okay. D5.9 infusing. Accucheck q6h. More alert this afternoon. Pt states \"I'm relaxing\"  8 to 10L HF NC SpO2 88-94% today.  RR i pre-medicate as appropriate  Outcome: Progressing     Problem: SAFETY ADULT - FALL  Goal: Free from fall injury  Description  INTERVENTIONS:  - Assess pt frequently for physical needs  - Identify cognitive and physical deficits and behaviors that affect ri GASTROINTESTINAL - ADULT  Goal: Minimal or absence of nausea and vomiting  Description  INTERVENTIONS:  - Maintain adequate hydration with IV or PO as ordered and tolerated  - Nasogastric tube to low intermittent suction as ordered  - Evaluate effectivenes Goal  Description  Patient's Long Term Goal: To go home    Interventions:    - See additional Care Plan goals for specific interventions  Outcome: Not Progressing     Problem: DISCHARGE PLANNING  Goal: Discharge to home or other facility with appropriate r cardiac monitoring, monitor vital signs, obtain 12 lead EKG if indicated  - Evaluate effectiveness of antiarrhythmic and heart rate control medications as ordered  - Initiate emergency measures for life threatening arrhythmias  - Monitor electrolytes and a

## 2020-05-09 PROCEDURE — 99233 SBSQ HOSP IP/OBS HIGH 50: CPT | Performed by: INTERNAL MEDICINE

## 2020-05-09 NOTE — PROGRESS NOTES
Pulmonary/Critical Care Follow Up Note    HPI:   Bobbi Menezes is a 67year old male with Patient presents with:  Dyspnea NEELAM SOB      PCP Boubacar Buchanan MD  Admission Attending Janell Leonardo 15 Day #20      N/V   sahara Gregory PRN  •  morphINE sulfate (PF) 2 MG/ML injection 2 mg, 2 mg, Intravenous, Q2H PRN  •  LORazepam (ATIVAN) injection 1 mg, 1 mg, Intravenous, Q4H PRN  •  Pantoprazole Sodium (PROTONIX) 40 mg in Sodium Chloride 0.9 % 10 mL IV push, 40 mg, Intravenous, Daily  • Stable    5/8 mild improvement           ASSESSMENT/PLAN:     COVID-19  c/b AHRF/ARDS  S/p failed trial of helmet ventilation and failed NIV/vapotherm  Intubated 4/30 and extubated 5/7  Tolerating well  FiO2 S/p PQ and Azithro  S/p Actremra  ++ obstruction

## 2020-05-09 NOTE — SLP NOTE
SLP attempt this AM. Per RN Kenneth Mendoza, pt not appropriate for BSE this AM secondary to lethargy. SLP to f/u as pt appropriate and/or as schedule allows. Thank you. Donya Jackson [id number] [facility]  Speech Language Pathologist  Phone Number Ext. [phone]

## 2020-05-09 NOTE — PLAN OF CARE
Drowsiness, nausea/vomitting yesterday- tube feeding held, on hold until later this morning per MD and dietary. Abdominal XR okay. D5.9 infusing. Accucheck q6h WNL. Patient alert and orientated. On 9L HF NC overnight and tolerated well. RR in 20's.  Vitals and/or relaxation techniques  - Monitor for opioid side effects  - Notify MD/LIP if interventions unsuccessful or patient reports new pain  - Anticipate increased pain with activity and pre-medicate as appropriate  Outcome: Progressing     Problem: SAFETY and preferred communication style  - Implement communication aides and strategies  - Use visual cues when possible  - Listen attentively, be patient, do not interrupt  - Minimize distractions  - Allow time for understanding and response  - Establish method signs, obtain 12 lead EKG if indicated  - Evaluate effectiveness of antiarrhythmic and heart rate control medications as ordered  - Initiate emergency measures for life threatening arrhythmias  - Monitor electrolytes and administer replacement therapy as o Promote sleep, encourage patient's normal rest cycle i.e. lights off, TV off, minimize noise and interruptions  - Encourage family to assist in orientation and promotion of home routines  Outcome: Progressing

## 2020-05-09 NOTE — SLP NOTE
ADULT SWALLOWING EVALUATION    ASSESSMENT    ASSESSMENT/OVERALL IMPRESSION:  PT COVID-19 POSITIVE. CONTACT AND DROPLET ISOLATION PPE REQUIRED. THIS THERAPIST WORE GOWN, GLOVES, FACE SHIELD, AND DROPLET/N95 RESPIRATOR MASK.  HANDS SANITIZED/WASHED UPON ENTRA swallowing compensatory strategies. Results and recommendations reviewed with RN Verna Hong and pt. SLP collaborated with RN for MD diet orders. FCM SCORE: 3/7     PLAN:  SLP to f/u x4 meal assessment, trial upgrade as appropriate, and monitor CXR.     Prisca Diop 5/08/2020 at 7:52 AM       Finalized by (CST): Payton Randhawa MD on 5/08/2020 at 7:54 AM         OBJECTIVE   ORAL MOTOR EXAMINATION  Dentition: Edentulous(Has upper and lower dentures at home)  Symmetry: Within Functional Limits  Strength:  Within [employer] only, Eliminate distractions with moderate assistance 100 % of the time across 4 sessions.     In Progress     FOLLOW UP  Treatment Plan/Recommendations: Aspiration precautions  Number of Visits to Meet Established Goals: 4  Follow Up Needed: Yes  MILLY Gómez

## 2020-05-09 NOTE — PLAN OF CARE
Utilizing iPad Language Line for [facility] / [address] [place] [address] Drive interpretation. Patient is [facility] speaking only and family would like to reiterate that to staff. N/V early this morning. Burping and spitting often. SLP eval = nectar thick, pureed, meds crushed.  MD Utilize distraction and/or relaxation techniques  - Monitor for opioid side effects  - Notify MD/LIP if interventions unsuccessful or patient reports new pain  - Anticipate increased pain with activity and pre-medicate as appropriate  Outcome: Progressing communication ability and preferred communication style  - Implement communication aides and strategies  - Use visual cues when possible  - Listen attentively, be patient, do not interrupt  - Minimize distractions  - Allow time for understanding and respon monitoring, monitor vital signs, obtain 12 lead EKG if indicated  - Evaluate effectiveness of antiarrhythmic and heart rate control medications as ordered  - Initiate emergency measures for life threatening arrhythmias  - Monitor electrolytes and administe Progressing  Goal: Maintains adequate nutritional intake (undernourished)  Description  INTERVENTIONS:  - Monitor percentage of each meal consumed  - Identify factors contributing to decreased intake, treat as appropriate  - Assist with meals as needed  -

## 2020-05-10 PROCEDURE — 99232 SBSQ HOSP IP/OBS MODERATE 35: CPT | Performed by: INTERNAL MEDICINE

## 2020-05-10 RX ORDER — SENNOSIDES 8.6 MG
8.6 TABLET ORAL 2 TIMES DAILY
Status: DISCONTINUED | OUTPATIENT
Start: 2020-05-10 | End: 2020-05-19

## 2020-05-10 NOTE — PLAN OF CARE
Utilizing iPad Language Line for [facility] / [place] interpretation. Patient is [facility] speaking only and family would like to reiterate that to staff. No further nausea or vomiting overnight. SLP eval = nectar thick, pureed, meds crushed.  NG d/c'd and analgesics based on type and severity of pain and evaluate response  - Implement non-pharmacological measures as appropriate and evaluate response  - Consider cultural and social influences on pain and pain management  - Manage/alleviate anxiety  - Utilize functional status, cognitive ability or social support system  Outcome: Progressing     Problem: Altered Communication/Language Barrier  Goal: Patient/Family is able to understand and participate in their care  Description  Interventions:  - Assess communi signs of decreased coronary artery perfusion - ex.  Angina  - Evaluate fluid balance, assess for edema, trend weights  Outcome: Progressing  Goal: Absence of cardiac arrhythmias or at baseline  Description  INTERVENTIONS:  - Continuous cardiac monitoring, m Progressing     Problem: Delirium  Goal: Minimize duration of delirium  Description  Interventions:  - Encourage use of hearing aids, eye glasses  - Promote highest level of mobility daily  - Provide frequent reorientation  - Promote wakefulness i.e. light

## 2020-05-10 NOTE — PLAN OF CARE
7L HF NC at rest, SpO2 >95%. RR <20. No cough, able to cough on command. Oriented x4, improved communication and energy s/p phone call with family (misses them very much), family encouraging him to eat and get stronger. Pureed, nectar thick diet.  [employer] effects  - Notify MD/LIP if interventions unsuccessful or patient reports new pain  - Anticipate increased pain with activity and pre-medicate as appropriate  Outcome: Progressing     Problem: SAFETY ADULT - FALL  Goal: Free from fall injury  Description suctioning and perform as needed  - Assess and instruct to report SOB or any respiratory difficulty  - Respiratory Therapy support as indicated  - Manage/alleviate anxiety  - Monitor for signs/symptoms of CO2 retention  Outcome: Progressing     Problem: CA treat as appropriate  - Assist with meals as needed  - Monitor I&O, WT and lab values  - Obtain nutritional consult as needed  - Optimize oral hygiene and moisture  - Encourage food from home; allow for food preferences  - Enhance eating environment  Outco and vomiting  Description  INTERVENTIONS:  - Maintain adequate hydration with IV or PO as ordered and tolerated  - Nasogastric tube to low intermittent suction as ordered  - Evaluate effectiveness of ordered antiemetic medications  - Provide nonpharmacolog

## 2020-05-10 NOTE — PROGRESS NOTES
[facility]     Progress Note        Xin Chandler Patient Status:  Inpatient    1940 MRN [id number]   Location [facility] 1W Attending Zoltan Oviedo MD   Hosp Day # 21 PCP Boubacar Buchanan MD       Subjective: (LEVOPHED) 4 mg/250 ml premix infusion, 0.5-30 mcg/min, Intravenous, Continuous PRN  nitroGLYCERIN (NITRO-BID 2 % TD OINT) 2 % ointment 0.5 inch, 0.5 inch, Topical, Q6H PRN  dextrose 10 % infusion, , Intravenous, Continuous PRN  morphINE sulfate (PF) 2 MG/ 5/08/2020 at 12:18 PM     Finalized by (CST): Caitlin Franco MD on 5/08/2020 at 12:19 PM                  Assessment   1. COVID-19  2. Acute hypoxemic respiratory failure  3.   Hypertension       Plan   -Patient with evidence of acute hypoxemic respirator

## 2020-05-11 PROCEDURE — 99233 SBSQ HOSP IP/OBS HIGH 50: CPT | Performed by: HOSPITALIST

## 2020-05-11 PROCEDURE — 99233 SBSQ HOSP IP/OBS HIGH 50: CPT | Performed by: INTERNAL MEDICINE

## 2020-05-11 RX ORDER — ALBUTEROL SULFATE 90 UG/1
2 AEROSOL, METERED RESPIRATORY (INHALATION)
Status: DISCONTINUED | OUTPATIENT
Start: 2020-05-11 | End: 2020-05-19

## 2020-05-11 NOTE — PLAN OF CARE
Received patient in bed, on 7L/min HFNC. Patient alert, calm and cooperative. Patient did not sleep much this shift, states he slept a lot during the day. Patient with stable vital signs throughout shift, able to take meds crushed in puree.        Problem: increased pain with activity and pre-medicate as appropriate  Outcome: Progressing     Problem: SAFETY ADULT - FALL  Goal: Free from fall injury  Description  INTERVENTIONS:  - Assess pt frequently for physical needs  - Identify cognitive and physical defi not interrupt  - Minimize distractions  - Allow time for understanding and response  - Establish method for patient to ask for assistance (call light)  - Provide an  as needed  - Communicate barriers and strategies to overcome with those who int measures for life threatening arrhythmias  - Monitor electrolytes and administer replacement therapy as ordered  Outcome: Progressing     Problem: GASTROINTESTINAL - ADULT  Goal: Minimal or absence of nausea and vomiting  Description  INTERVENTIONS:  Amy Phillips orientation and promotion of home routines  Outcome: Progressing

## 2020-05-11 NOTE — PROGRESS NOTES
[facility]    Progress Note    Cinyd Gonsalez Patient Status:  Inpatient    1940 MRN [id number]   Location [facility] 1W Attending Melani Knox MD   Hosp Day # 25 PCP Boubacar Buchanan MD       Subjective: docusate sodium  100 mg Oral BID       Current PRN Inpatient Meds:      PEG 3350, magnesium hydroxide, bisacodyl, Fleet Enema, Normal Saline Flush, morphINE sulfate, LORazepam, acetaminophen, ondansetron HCl, hydrALAzine HCl, zolpidem, guaiFENesin-codeine extubation. No pneumothorax. There has been some interval improvement in right-sided airspace disease, and essentially unchanged moderately extensive left-sided airspace disease.   Findings suggest some improvement in right-sided pneumonia and or pulmonar have slightly worsened at the lung bases bilaterally.     Dictated by (CST): Yoli Clinton MD on 5/03/2020 at 7:54 AM     Finalized by (CST): Yoli Clinton MD on 5/03/2020 at 7:56 AM          Xr Chest Ap Portable  (cpt=71045)    Result Date: 5/2/2 PCT 0.32 (H) 04/20/2020       Assessment and Plan:     Acute respiratory failure with hypoxia ([facility] [address]  COVID-19 virus infection  Extubated on 5/7  Currently tolerating HFNC 5L  S/p PQ  S/p Azithro  S/p actemra  S.p convalescent plasma  SLP following - ok for

## 2020-05-11 NOTE — SLP NOTE
SPEECH DAILY NOTE - INPATIENT    ASSESSMENT & PLAN   ASSESSMENT  PT COVID-19 POSITIVE. CONTACT AND DROPLET ISOLATION PPE REQUIRED. THIS THERAPIST WORE GOWN, GLOVES, FACE SHIELD, AND DROPLET/N95 RESPIRATOR MASK. HANDS SANITIZED/WASHED UPON ENTRANCE/EXIT. consistencies;Small bites and sips  Aspiration Precautions: Upright position; Slow rate;Small bites and sips; No straw  Medication Administration Recommendations: Crushed in puree    Patient Experiencing Pain: No    Discharge Recommendations  Discharge Recom adhered to strict use of safe swallowing compensatory strategies. TV off to eliminate distractions. Continue to reinforce across future meals.      In Progress     FOLLOW UP  Follow Up Needed: Yes  SLP Follow-up Date: 05/10/20  Number of Visits to Meet [facility] [place]

## 2020-05-11 NOTE — OCCUPATIONAL THERAPY NOTE
OCCUPATIONAL THERAPY EVALUATION - INPATIENT     Room Number: 105/105-A  Evaluation Date: 5/11/2020  Type of Evaluation: Initial       Physician Order: IP Consult to Occupational Therapy  Reason for Therapy: ADL/IADL Dysfunction and Discharge Planning    OC Problem: Respiratory Impairment - Respiratory Therapy 253  Intervention: Inhaled medication delivery  Intervention Status  Done  Pulmicort BID         and self- feeding in supported sitting position provided set-up. Pt with shortness of breath while sitting EOB.     To assess pt's safe participation during daily tasks while also providing intermittent education to maximize safety and [place]      SUBJECTIVE  Agreeable to activity     OCCUPATIONAL THERAPY EXAMINATION      OBJECTIVE  Precautions: (Contact & Airborne; L & R arm precautions)  Fall Risk: High fall risk    COGNITION  · Overall Cognitive Status:  WFL - within f Patient will complete functional transfer with SBA  Comment:     Patient will complete LE dressing with SBA  Comment:     Patient will tolerate standing for 3 minutes in prep for adls with SBA   Comment:    Patient will complete item retrieval with SBA  Co

## 2020-05-11 NOTE — PHYSICAL THERAPY NOTE
PHYSICAL THERAPY EVALUATION - INPATIENT     Room Number: 105/105-A  Evaluation Date: 5/11/2020  Type of Evaluation: Initial   Physician Order: PT Eval and Treat    Presenting Problem: +COVID-19; Intubated 4/30;  Extubated 5/7  Reason for Therapy: [facility] services to address these deficits in preparation for discharge. DISCHARGE RECOMMENDATIONS  PT Discharge Recommendations: Acute rehabilitation    PLAN  PT Treatment Plan: Bed mobility;Transfer training;Gait training;Family education;Patient education; En PAIN ASSESSMENT             COGNITION  · Overall Cognitive Status:  WFL - within functional limits  · Following Commands:  follows multistep commands consistently      BALANCE  Static Sitting: Fair -  Dynamic Sitting: Fair -  Static Standing: Poor able to demonstrate transfers Sit to/from Stand at assistance level: minimum assistance with walker - rolling     Goal #2  Current Status    Goal #3 Patient is able to ambulate 25 feet with assist device: walker - rolling at assistance level: minimum suzie

## 2020-05-11 NOTE — PROGRESS NOTES
Pulmonary/Critical Care Follow Up Note    HPI:   Cindy Gonsalez is a 67year old male with Patient presents with:  Dyspnea NEELAM SOB      PCP Boubacar Buchanan MD  Admission Attending Janell Hernández 15 Day #22      No n/v  Feeling bet dextrose 10 % infusion, , Intravenous, Continuous PRN  •  morphINE sulfate (PF) 2 MG/ML injection 2 mg, 2 mg, Intravenous, Q2H PRN  •  LORazepam (ATIVAN) injection 1 mg, 1 mg, Intravenous, Q4H PRN  •  acetaminophen (TYLENOL) tab 650 mg, 650 mg, Oral, Q6H P Statin    Stress as outpt    nitrate        ID  COVID19  No fever  Inc wbc down and Band 3%  S/p prolonged course iv abx  Plan     Follow off abx                  HTN  NL  Plan hold CCB        HL  Plan statin    N/V  Soft abd  Plan AXR   Follow    FEN  N/V

## 2020-05-12 PROCEDURE — 99232 SBSQ HOSP IP/OBS MODERATE 35: CPT | Performed by: INTERNAL MEDICINE

## 2020-05-12 PROCEDURE — 99233 SBSQ HOSP IP/OBS HIGH 50: CPT | Performed by: HOSPITALIST

## 2020-05-12 RX ORDER — POTASSIUM CHLORIDE 14.9 MG/ML
20 INJECTION INTRAVENOUS ONCE
Status: COMPLETED | OUTPATIENT
Start: 2020-05-12 | End: 2020-05-12

## 2020-05-12 RX ORDER — POTASSIUM CHLORIDE 14.9 MG/ML
INJECTION INTRAVENOUS
Status: COMPLETED
Start: 2020-05-12 | End: 2020-05-12

## 2020-05-12 RX ORDER — MELATONIN
325
Status: DISCONTINUED | OUTPATIENT
Start: 2020-05-13 | End: 2020-05-19

## 2020-05-12 NOTE — PLAN OF CARE
Received patient in bed, on 5L/min oxygen via HFNC. Patient on bedpan attempting to have bowel movement. Patient successful, had small bowel movement.  Shortly after, patient requested bed pan again, stating he felt he needed to have another bowel movement care  Description  Interventions:  - What would you like us to know as we care for you?  I speak [place]  - Provide timely, complete, and accurate information to patient/family  - Incorporate patient and family knowledge, values, beliefs, and cultural backg resources  Description  INTERVENTIONS:  - Identify barriers to discharge w/pt and caregiver  - Include patient/family/discharge partner in discharge planning  - Arrange for needed discharge resources and transportation as appropriate  - Identify discharge suctioning and perform as needed  - Assess and instruct to report SOB or any respiratory difficulty  - Respiratory Therapy support as indicated  - Manage/alleviate anxiety  - Monitor for signs/symptoms of CO2 retention  Outcome: Progressing     Problem: CA tolerated  - Evaluate effectiveness of GI medications  - Encourage mobilization and activity  - Obtain nutritional consult as needed  - Establish a toileting routine/schedule  - Consider collaborating with pharmacy to review patient's medication profile  O

## 2020-05-12 NOTE — PROGRESS NOTES
Pulmonary/Critical Care Follow Up Note    HPI:   Rody Barragan is a 67year old male with Patient presents with:  Dyspnea NEELAM SOB      PCP Boubacar Buchanan MD  Admission Attending Janell Wolff 15 Day #23    No events          PM mg, 4 mg, Intravenous, Q6H PRN  •  hydrALAzine HCl (APRESOLINE) injection 10 mg, 10 mg, Intravenous, Q4H PRN  •  zolpidem (AMBIEN) tab 5 mg, 5 mg, Oral, Nightly PRN  •  guaiFENesin-codeine (ROBITUSSIN AC) 100-10 MG/5ML syrup 5 mL, 5 mL, Oral, Q4H PRN 3%  S/p prolonged course iv abx  Plan     Follow off abx                  HTN  NL to low  Plan hold CCB        HL  Plan statin    N/V  Soft abd  Plan AXR   Follow    FEN  N/V better  Passed swallow  Plan    advavnce diet as toelrated     DVT px  Lovenox

## 2020-05-12 NOTE — PROGRESS NOTES
[facility]    Progress Note    Thomas Knows Patient Status:  Inpatient    1940 MRN [id number]   Location [facility] 1W Attending Ernestina Butcher MD   Hosp Day # 23 PCP Boubacar Buchanan MD       Subjective: aspirin  81 mg Per NG Tube Daily   • atorvastatin  10 mg Per NG Tube Nightly   • docusate sodium  100 mg Oral BID       Current PRN Inpatient Meds:      PEG 3350, magnesium hydroxide, bisacodyl, Fleet Enema, Normal Saline Flush, morphINE sulfate, LORazepam (cpt=71045)    Result Date: 5/8/2020  CONCLUSION:  1. Status post extubation. No pneumothorax. There has been some interval improvement in right-sided airspace disease, and essentially unchanged moderately extensive left-sided airspace disease.   Findings throughout the lungs consistent with viral pneumonia. These opacities have slightly worsened at the lung bases bilaterally.     Dictated by (CST): Alexandra Goodell, MD on 5/03/2020 at 7:54 AM     Finalized by (CST): Alexandra Goodell, MD on 5/03/2020 at 7 CRP 5.33 (H) 04/26/2020    PCT 0.03 05/06/2020    PCT 0.16 04/29/2020    PCT 0.32 (H) 04/20/2020       Assessment and Plan:     Acute respiratory failure with hypoxia ([facility] [address]  COVID-19 virus infection  Extubated on 5/7  Currently tolerating HFNC 5L  S/p PQ

## 2020-05-12 NOTE — PHYSICAL THERAPY NOTE
PHYSICAL THERAPY TREATMENT NOTE - INPATIENT     Room Number: 105/105-A       Presenting Problem: +COVID-19; Intubated 4/30;  Extubated 5/7    Problem List  Principal Problem:    COVID-19 virus infection  Active Problems:    Acute respiratory failure with hy Recommendations: Acute rehabilitation     PLAN  PT Treatment Plan: Bed mobility;Transfer training;Gait training;Family education;Patient education; Energy conservation; Endurance; Body mechanics; Coordination;Balance training;Stair training;Stoop training;Stre bed>chair  Assistive Device: Rolling walker  Pattern: Shuffle(R leg buckling)  Stoop/Curb Assistance: Not tested       THERAPEUTIC EXERCISES  Lower Extremity Ankle pumps  LAQ     Position Sitting       Patient End of Session: Up in chair;Needs met;Call lig

## 2020-05-12 NOTE — DIETARY NOTE
ADULT NUTRITION REASSESSMENT     RECOMMENDATIONS TO MD:  CPM     Pt is at moderate nutrition risk.       NUTRITION DIAGNOSIS/PROBLEM:  Inadequate oral intake related to decreased ability to consume sufficient amount of energy in the setting of viral acute i Osmolality starting to elevate, noting 1.7 Liter free water Deficit. Will slightly increase water flushes from 30 to 60 ml q 4 hrs. And will monitor and adjust as needed.  Per MD, COVID-19, c/b AHRF/ARDS, Currently managed with NIV and can alternate with Va mineral supplements:   multivitamin/mineral --discontinued on 4/27 d/t TF.   - Nutrition education: assess education needs Gave written diet hand out re: nutrition and hydration during [place] (from [id number] [employer]).    - Feeding assistance: meal set up  - Coordinati 3.9 3.7    104 105   CO2 32.0 32.0 33.0*   PHOS 2.6  --   --    OSMOCALC [phone]     NUTRITION RELATED PHYSICAL FINDINGS:   - Body Fat/Muscle Mass: appears well nourished per observation by room's glass window but  *Unable to conduct face to face

## 2020-05-12 NOTE — CM/SW NOTE
Discharge Planning:    Notified by PTDariela, that recommendation is for Acute Rehab. PMR order entered. / to remain available for support and/or discharge planning.      List of Inpatient Rehab Facilities with quality data

## 2020-05-12 NOTE — CONSULTS
[facility] Status:  In[facility]    1940 MRN [id number]   Location [facility] 1W Attending Ronnie Torres MD   Hosp Day # 23 PCP Boubacar Buchanan MD     Patient Identification  Jordan Saint i potential caregivers): spouse / significant other  Home Environment / Accessibility: 2-story house  Current Functional Status:  Max A x2      Past Medical History:  @Medical hx@  Past Medical History:   Diagnosis Date   • Essential hypertension    • High bl mL, 1 enema, Rectal, Once PRN  [COMPLETED] norepinephrine (LEVOPHED) 4 mg/250 ml premix infusion, , ,   [COMPLETED] vancomycin IVPB premix 2g in 0.9% NaCl 500 mL, 25 mg/kg, Intravenous, Once  [COMPLETED] methylPREDNISolone Sodium Succ (Solu-MEDROL) injecti Never      Frequency: Never      History reviewed. No pertinent family history.   No h/o cancers  Allergies:  No Known Allergies        Lab Results   Component Value Date    WBC 8.8 05/12/2020    HGB 11.3 05/12/2020    HCT 34.7 05/12/2020    .0 05/12 Right Lower Extremity:  Strength  is 3. ROM WNL. Left Lower Extremity: Strength  is 3. ROM WNL. Neuro: CNII-XII are grossly intact.  Sensation to dull touch intact in all extremities and reflexes are 2+, bilateral                     Psychi Moni Herrera MD  [facility].

## 2020-05-12 NOTE — PLAN OF CARE
Problem: Patient/Family Goals  Goal: Patient/Family Long Term Goal  Description  Patient's Long Term Goal: To go home    Interventions:    - See additional Care Plan goals for specific interventions  Outcome: Progressing  Goal: Patient/Family Short Term physical deficits and behaviors that affect risk of falls.   - [place] fall precautions as indicated by assessment.  - Educate pt/family on patient safety including physical limitations  - Instruct pt to call for assistance with activity based on assessme those who interact with patient  Outcome: Progressing     Problem: RESPIRATORY - ADULT  Goal: Achieves optimal ventilation and oxygenation  Description  INTERVENTIONS:  - Assess for changes in respiratory status  - Assess for changes in mentation and behav vomiting  Description  INTERVENTIONS:  - Maintain adequate hydration with IV or PO as ordered and tolerated  - Nasogastric tube to low intermittent suction as ordered  - Evaluate effectiveness of ordered antiemetic medications  - Provide nonpharmacologic c liters high flow n/c. Groin rash, myconizole powder, buttocks red, voiding per urinal,  pt face time with his wife using his cell phone,in chair position while in bed.

## 2020-05-12 NOTE — OCCUPATIONAL THERAPY NOTE
OCCUPATIONAL THERAPY TREATMENT NOTE - INPATIENT        Room Number: 105/105-A                Problem List  Principal Problem:    KXFYZ-50 virus infection  Active Problems:    Acute respiratory failure with hypoxia ([facility] [address]      OCCUPATIONAL THERAPY ASSESSMENT education;Patient/Family training;Cognitive reorientation; Endurance training;Visual perceptual training;Neuromuscluar reeducation; Fine motor coordination activities; Compensatory technique education    SUBJECTIVE  Patient pleasant and cooperative; motivated complete LE dressing with SBA  Comment:   Mod  A    Patient will tolerate standing for 3 minutes in prep for adls with SBA   Comment: Progressing; 1 minute with Mod A x2     Patient will complete item retrieval with SBA  Comment: NT          Goals  on

## 2020-05-13 PROCEDURE — 99233 SBSQ HOSP IP/OBS HIGH 50: CPT | Performed by: HOSPITALIST

## 2020-05-13 PROCEDURE — 99233 SBSQ HOSP IP/OBS HIGH 50: CPT | Performed by: INTERNAL MEDICINE

## 2020-05-13 RX ORDER — POTASSIUM CHLORIDE 20 MEQ/1
40 TABLET, EXTENDED RELEASE ORAL EVERY 4 HOURS
Status: COMPLETED | OUTPATIENT
Start: 2020-05-13 | End: 2020-05-13

## 2020-05-13 NOTE — OCCUPATIONAL THERAPY NOTE
OCCUPATIONAL THERAPY TREATMENT NOTE - INPATIENT        Room Number: 151/217-H                Problem List  Principal Problem:    JRYQA-56 virus infection  Active Problems:    Acute respiratory failure with hypoxia ([facility] [address]      OCCUPATIONAL THERAPY ASSESSMENT now?    OBJECTIVE  Precautions: Bed/chair alarm    WEIGHT BEARING RESTRICTION  Weight Bearing Restriction: None                PAIN ASSESSMENT  Ratin           ACTIVITY TOLERANCE  Pulse: 90        BP: 133/57  BP Location: Left arm  BP Method: Automatic

## 2020-05-13 NOTE — PROGRESS NOTES
Double RN skin check done prior to transfer off Unit. Skin check performed by this RN and Corey Yun RN. No wounds noted. Buttocks are red and blanchable. Will remain available for any further questions or concerns.

## 2020-05-13 NOTE — PROGRESS NOTES
Received patient in stable condition. Oxygen via NC @4L. Saturation 95%. No distress noted. Denies pain. Vitals stable. Pt oriented to room. Reinforced to call when needed. Call light within reach. Tele in place reporting NSR. Will continue to monitor.

## 2020-05-13 NOTE — PROGRESS NOTES
[facility]    Progress Note    Bobbi Menezes Patient Status:  Inpatient    1940 MRN [id number]   Location [address] [facility] Attending Hermelindo Bojorquez MD   Hosp Day # 24 PCP Boubacar Buchanan MD        Subjective: 05/13/2020    GLU 83 05/13/2020    CA 8.6 05/13/2020    ALB 2.5 (L) 05/12/2020    ALKPHO 61 05/12/2020    BILT 0.5 05/12/2020    TP 5.8 (L) 05/12/2020    AST 22 05/12/2020    ALT 73 (H) 05/12/2020     (H) 05/10/2020    DDIMER >20.00 (H) 05/02/2020

## 2020-05-13 NOTE — PHYSICAL THERAPY NOTE
PHYSICAL THERAPY TREATMENT NOTE - INPATIENT     Room Number: 346/103-W       Presenting Problem: +COVID-19; Intubated 4/30;  Extubated 5/7    Problem List  Principal Problem:    COVID-19 virus infection  Active Problems:    Acute respiratory failure with hy education; Energy conservation; Endurance; Body mechanics; Coordination;Balance training;Stair training;Stoop training;Strengthening    SUBJECTIVE  \"I am very tired\"    OBJECTIVE  Precautions: (L/R arm precautions, airborne, contact )    WEIGHT BEARING RESTR EXERCISES  Lower Extremity Ankle pumps  LAQ     Position Sitting       Patient End of Session: Up in chair;Needs met;Call light within reach;RN aware of session/findings; Alarm set    CURRENT GOALS   Goals to be met by: 5/25/2020  Patient Goal Patient's sarika

## 2020-05-13 NOTE — PROGRESS NOTES
Pulmonary/Critical Care Follow Up Note    HPI:   Daniella Sawyer is a 67year old male with Patient presents with:  Dyspnea NEELAM SOB      PCP Boubacar Buchanan MD  Admission Attending Janell Borjas 15 Day #24    No events  Feeling we Q4H PRN  •  acetaminophen (TYLENOL) tab 650 mg, 650 mg, Oral, Q6H PRN  •  ondansetron HCl (ZOFRAN) injection 4 mg, 4 mg, Intravenous, Q6H PRN  •  hydrALAzine HCl (APRESOLINE) injection 10 mg, 10 mg, Intravenous, Q4H PRN  •  zolpidem (AMBIEN) tab 5 mg, 5 mg Band 3%  S/p prolonged course iv abx  Plan     Follow off abx                  HTN  NL to low  Plan hold CCB        HL  Plan statin    N/V  Soft abd  Plan AXR   Follow    FEN  N/V better  Passed swallow  Plan    advavnce diet as toelrated     DVT px  Loven

## 2020-05-13 NOTE — PLAN OF CARE
Problem: Patient/Family Goals  Goal: Patient/Family Long Term Goal  Description  Patient's Long Term Goal: To go home    Interventions:   Identify needs for rehab   Follow PT/OT recommendation   Take medication as prescribed  - See additional Care Plan g SAFETY ADULT - FALL  Goal: Free from fall injury  Description  INTERVENTIONS:  - Assess pt frequently for physical needs  - Identify cognitive and physical deficits and behaviors that affect risk of falls.   - [place] fall precautions as indicated by asse method for patient to ask for assistance (call light)  - Provide an  as needed  - Communicate barriers and strategies to overcome with those who interact with patient  Outcome: Progressing     Problem: RESPIRATORY - ADULT  Goal: Achieves optimal as ordered  Outcome: Progressing     Problem: GASTROINTESTINAL - ADULT  Goal: Minimal or absence of nausea and vomiting  Description  INTERVENTIONS:  - Maintain adequate hydration with IV or PO as ordered and tolerated  - Nasogastric tube to low intermitte today. Pt's family will bring dentures, so pt's diet can be upgraded.  Pt had no pain, no complaint, call light within reach

## 2020-05-13 NOTE — SLP NOTE
SPEECH DAILY NOTE - INPATIENT    ASSESSMENT & PLAN   ASSESSMENT  PT COVID-19 POSITIVE. CONTACT AND DROPLET ISOLATION PPE REQUIRED. THIS THERAPIST WORE GOWN, GLOVES, FACE SHIELD, AND DROPLET/N95 RESPIRATOR MASK. HANDS SANITIZED/WASHED UPON ENTRANCE/EXIT. sips;No straw  Medication Administration Recommendations: No restrictions    Patient Experiencing Pain: No    Discharge Recommendations  Discharge Recommendations/Plan: Undetermined    Treatment Plan  Treatment Plan/Recommendations: Aspiration precautions; degrees, Liquids via tsp amount only, Eliminate distractions with moderate assistance 100 % of the time across 4 sessions. Pt adheres to strategies. Continue to reinforce across consistencies given.      In Progress     FOLLOW UP  Follow Up Needed: Yes  SL

## 2020-05-14 PROCEDURE — 99232 SBSQ HOSP IP/OBS MODERATE 35: CPT | Performed by: INTERNAL MEDICINE

## 2020-05-14 PROCEDURE — 99233 SBSQ HOSP IP/OBS HIGH 50: CPT | Performed by: HOSPITALIST

## 2020-05-14 RX ORDER — PREDNISONE 20 MG/1
20 TABLET ORAL
Status: DISCONTINUED | OUTPATIENT
Start: 2020-05-15 | End: 2020-05-19

## 2020-05-14 NOTE — PLAN OF CARE
VSS. Patient weaned to 3L O2 HFNC. Pulse ox continuous in patient room. No complaints of SOB or pain. Patient resting comfortably with call light in reach, bed alarm activated. PICC line flushing well, difficulty noted with getting blood return.  Frequent r Manage/alleviate anxiety  - Utilize distraction and/or relaxation techniques  - Monitor for opioid side effects  - Notify MD/LIP if interventions unsuccessful or patient reports new pain  - Anticipate increased pain with activity and pre-medicate as approp care  Description  Interventions:  - Assess communication ability and preferred communication style  - Implement communication aides and strategies  - Use visual cues when possible  - Listen attentively, be patient, do not interrupt  - Minimize distraction baseline  Description  INTERVENTIONS:  - Continuous cardiac monitoring, monitor vital signs, obtain 12 lead EKG if indicated  - Evaluate effectiveness of antiarrhythmic and heart rate control medications as ordered  - Initiate emergency measures for life t daily  - Provide frequent reorientation  - Promote wakefulness i.e. lights on, blinds open  - Promote sleep, encourage patient's normal rest cycle i.e. lights off, TV off, minimize noise and interruptions  - Encourage family to assist in orientation and pr

## 2020-05-14 NOTE — PROGRESS NOTES
Patient's belonging were given to him around 1300 with dentures in the bag; they were dropped off by son at ED late morning. Need speech eval with dentures to see if can progress.

## 2020-05-14 NOTE — PROGRESS NOTES
[facility]    Progress Note    Sheri Webb Patient Status:  Inpatient    1940 MRN [id number]   Location [facility]5W Attending Sandra Samson MD   Hosp Day # 25 PCP Boubacar Buchanan MD        Subjective: (L) 05/13/2020    BUN 13 05/13/2020     05/13/2020    K 4.5 05/13/2020     05/13/2020    CO2 32.0 05/13/2020    GLU 83 05/13/2020    CA 8.6 05/13/2020    ALB 2.5 (L) 05/12/2020    ALKPHO 61 05/12/2020    BILT 0.5 05/12/2020    TP 5.8 (L) 05/12/

## 2020-05-14 NOTE — PLAN OF CARE
Patient cooperative and pleasant; up to chair most early afternoon; very SOB when pivoting from the bed to chair and chair to bed; continues to be on 3L of oxygen. Good appetite;  Carol Husbands called asking specific questions regarding patient state of hea and evaluate response  - Implement non-pharmacological measures as appropriate and evaluate response  - Consider cultural and social influences on pain and pain management  - Manage/alleviate anxiety  - Utilize distraction and/or relaxation techniques  - M support system  Outcome: Progressing     Problem: Altered Communication/Language Barrier  Goal: Patient/Family is able to understand and participate in their care  Description  Interventions:  - Assess communication ability and preferred communication styl ex. Angina  - Evaluate fluid balance, assess for edema, trend weights  Outcome: Progressing  Goal: Absence of cardiac arrhythmias or at baseline  Description  INTERVENTIONS:  - Continuous cardiac monitoring, monitor vital signs, obtain 12 lead EKG if indic duration of delirium  Description  Interventions:  - Encourage use of hearing aids, eye glasses  - Promote highest level of mobility daily  - Provide frequent reorientation  - Promote wakefulness i.e. lights on, blinds open  - Promote sleep, encourage trino

## 2020-05-14 NOTE — CM/SW NOTE
11: 34AM   PADMAJA followed up on Acute Rehab Choices. SW spoke with pt's son, Speedy Lacy over the phone to discuss the Acute Rehab Referrals. Per Speedy Lacy - pt would like referrals for     1. [facility]   2. [address]   3.  [facility]

## 2020-05-14 NOTE — PROGRESS NOTES
Pulmonary/Critical Care Follow Up Note    HPI:   Dora Olson is a 67year old male with Patient presents with:  Dyspnea NEELAM SOB      PCP Boubacar Buchanan MD  Admission Attending Janell Blas 15 Day #25    No events  Feeling we mg, Oral, Q6H PRN  •  ondansetron HCl (ZOFRAN) injection 4 mg, 4 mg, Intravenous, Q6H PRN  •  hydrALAzine HCl (APRESOLINE) injection 10 mg, 10 mg, Intravenous, Q4H PRN  •  zolpidem (AMBIEN) tab 5 mg, 5 mg, Oral, Nightly PRN  •  guaiFENesin-codeine (ROBITUS sign off  Please call with any questions or concerns          Petty De La O MD

## 2020-05-15 PROCEDURE — 99233 SBSQ HOSP IP/OBS HIGH 50: CPT | Performed by: HOSPITALIST

## 2020-05-15 RX ORDER — POTASSIUM CHLORIDE 20 MEQ/1
40 TABLET, EXTENDED RELEASE ORAL ONCE
Status: COMPLETED | OUTPATIENT
Start: 2020-05-15 | End: 2020-05-15

## 2020-05-15 NOTE — PROGRESS NOTES
This writer tried calling the son, Derrick Chu, at 0, but could not leave a message via his voicemail because it went directly to a voicemail that's not set up.

## 2020-05-15 NOTE — PHYSICAL THERAPY NOTE
PHYSICAL THERAPY TREATMENT NOTE - INPATIENT     Room Number: 307/516-T       Presenting Problem: +COVID-19; Intubated 4/30;  Extubated 5/7    Problem List  Principal Problem:    COVID-19 virus infection  Active Problems:    Acute respiratory failure with hy try\"    OBJECTIVE  Precautions: Bed/chair alarm    WEIGHT BEARING RESTRICTION  Weight Bearing Restriction: None                PAIN ASSESSMENT   Ratin          BALANCE independent      Goal #1   Current Status  Min A x 1   Goal #2 Patient is able to demonstrate transfers Sit to/from Stand at assistance level: minimum assistance with walker - rolling      Goal #2  Current Status  Mod A x 1   Goal #3 Patient is able to amb

## 2020-05-15 NOTE — PROGRESS NOTES
[facility]    Progress Note    Rayo Louis Patient Status:  Inpatient    1940 MRN [id number]   Location [facility]5W Attending Marci Dubois MD   Hosp Day # 32 PCP Boubacar Buchanan MD        Subjective: Value Date    WBC 7.2 05/15/2020    HGB 11.0 (L) 05/15/2020    HCT 33.4 (L) 05/15/2020    .0 05/15/2020    CREATSERUM 0.56 (L) 05/15/2020    BUN 17 05/15/2020     05/15/2020    K 3.8 05/15/2020     05/15/2020    CO2 31.0 05/15/2020    GL

## 2020-05-15 NOTE — OCCUPATIONAL THERAPY NOTE
OCCUPATIONAL THERAPY TREATMENT NOTE - INPATIENT        Room Number: 683/401-T                Problem List  Principal Problem:    JCDEJ-68 virus infection  Active Problems:    Acute respiratory failure with hypoxia ([facility] [address]      OCCUPATIONAL THERAPY ASSESSMENT Rate Source: Monitor     BP: 126/70  BP Location: Left arm  BP Method: Automatic  Patient Position: Sitting    O2 SATURATIONS        SPO2 Ambulation on Oxygen: 93(93% with rest; 89% with activities)  Ambulation oxygen flow (liters per minute): 2    ACTIVIT

## 2020-05-15 NOTE — PLAN OF CARE
Patient is much more tired today; continues to desat when doing very little movement. Patient refused moving to chair this morning after going to the bathroom and having a bowel movement.   Patient resting comfortably in bed, locked in lowest position, call measures as appropriate and evaluate response  - Consider cultural and social influences on pain and pain management  - Manage/alleviate anxiety  - Utilize distraction and/or relaxation techniques  - Monitor for opioid side effects  - Notify MD/LIP if inte Communication/Language Barrier  Goal: Patient/Family is able to understand and participate in their care  Description  Interventions:  - Assess communication ability and preferred communication style  - Implement communication aides and strategies  - Use v trend weights  Outcome: Progressing  Goal: Absence of cardiac arrhythmias or at baseline  Description  INTERVENTIONS:  - Continuous cardiac monitoring, monitor vital signs, obtain 12 lead EKG if indicated  - Evaluate effectiveness of antiarrhythmic and hea Encourage use of hearing aids, eye glasses  - Promote highest level of mobility daily  - Provide frequent reorientation  - Promote wakefulness i.e. lights on, blinds open  - Promote sleep, encourage patient's normal rest cycle i.e. lights off, TV off, mini

## 2020-05-15 NOTE — CM/SW NOTE
PADMAJA follow-up:  PADMAJA noted family preference for rehab at [facility]. PADMAJA spoke w/liaison Randi Hurtado ([phone]) and provided updates. In order for pt to d/c to this rehab facility, pt needs to be on 4L NC O2 or less w/activity.   This O2 need has to be co

## 2020-05-15 NOTE — PLAN OF CARE
Problem: Patient/Family Goals  Goal: Patient/Family Long Term Goal  Description  Patient's Long Term Goal: To go home    Interventions:   Identify needs for rehab   Follow PT/OT recommendation   Take medication as prescribed  - See additional Care Plan g increased pain with activity and pre-medicate as appropriate  Outcome: Progressing     Problem: SAFETY ADULT - FALL  Goal: Free from fall injury  Description  INTERVENTIONS:  - Assess pt frequently for physical needs  - Identify cognitive and physical defi decreased coronary artery perfusion - ex.  Angina  - Evaluate fluid balance, assess for edema, trend weights  Outcome: Progressing  Goal: Absence of cardiac arrhythmias or at baseline  Description  INTERVENTIONS:  - Continuous cardiac monitoring, monitor vi nutritional consult as needed  - Establish a toileting routine/schedule  - Consider collaborating with pharmacy to review patient's medication profile  Outcome: Progressing  Goal: Maintains adequate nutritional intake (undernourished)  Description  Marcelo Alaniz

## 2020-05-15 NOTE — SLP NOTE
SPEECH DAILY NOTE - INPATIENT    ASSESSMENT & PLAN   ASSESSMENT  PT COVID-19 POSITIVE. CONTACT AND DROPLET ISOLATION PPE REQUIRED. THIS THERAPIST WORE GOWN, GLOVES, FACE SHIELD, AND DROPLET/N95 RESPIRATOR MASK. HANDS SANITIZED/WASHED UPON ENTRANCE/EXIT. Administration Recommendations: No restrictions    Patient Experiencing Pain: No       Discharge Recommendations  Discharge Recommendations/Plan: Undetermined    Treatment Plan  Treatment Plan/Recommendations: Aspiration precautions; Dysphagia therapy    In Please see above upgraded goal.  Goal met.  Updated    Goal #4 The patient will utilize compensatory strategies as outlined by  BSSE (clinical evaluation) including Slow rate, Small bites, Small sips, Alternate liquids/solids, No straws, Upright 90 degrees,

## 2020-05-16 PROCEDURE — 99233 SBSQ HOSP IP/OBS HIGH 50: CPT | Performed by: HOSPITALIST

## 2020-05-16 NOTE — PLAN OF CARE
Pt resting in chair, ambulates with assistance. On 3L O2, weaning as able. Patient desat to 86-89% after ambulating to chair with RN but recovered within 5 minutes to 95%.  Will DC to [facility] acute rehab pending insurance authorization and medical Consider cultural and social influences on pain and pain management  - Manage/alleviate anxiety  - Utilize distraction and/or relaxation techniques  - Monitor for opioid side effects  - Notify MD/LIP if interventions unsuccessful or patient reports new linda Patient/Family is able to understand and participate in their care  Description  Interventions:  - Assess communication ability and preferred communication style  - Implement communication aides and strategies  - Use visual cues when possible  - Listen att Progressing  Goal: Absence of cardiac arrhythmias or at baseline  Description  INTERVENTIONS:  - Continuous cardiac monitoring, monitor vital signs, obtain 12 lead EKG if indicated  - Evaluate effectiveness of antiarrhythmic and heart rate control medicati eye glasses  - Promote highest level of mobility daily  - Provide frequent reorientation  - Promote wakefulness i.e. lights on, blinds open  - Promote sleep, encourage patient's normal rest cycle i.e. lights off, TV off, minimize noise and interruptions  -

## 2020-05-16 NOTE — PROGRESS NOTES
[facility]    Progress Note    Jordan Saint Patient Status:  Inpatient    1940 MRN [id number]   Location [facility]5W Attending Christina Fair MD   Hosp Day # 32 PCP Boubacar Buchanan MD        Subjective: 05/15/2020    HGB 11.0 (L) 05/15/2020    HCT 33.4 (L) 05/15/2020    .0 05/15/2020    CREATSERUM 0.56 (L) 05/15/2020    BUN 17 05/15/2020     05/15/2020    K 4.0 05/16/2020     05/15/2020    CO2 31.0 05/15/2020    GLU 80 05/15/2020    CA

## 2020-05-16 NOTE — PLAN OF CARE
Problem: Patient/Family Goals  Goal: Patient/Family Long Term Goal  Description  Patient's Long Term Goal: To go home    Interventions:   Identify needs for rehab   Follow PT/OT recommendation   Take medication as prescribed  - See additional Care Plan g goal  Description  INTERVENTIONS:  - Encourage pt to monitor pain and request assistance  - Assess pain using appropriate pain scale  - Administer analgesics based on type and severity of pain and evaluate response  - Implement non-pharmacological measures fluid balance, assess for edema, trend weights  Outcome: Progressing  Goal: Absence of cardiac arrhythmias or at baseline  Description  INTERVENTIONS:  - Continuous cardiac monitoring, monitor vital signs, obtain 12 lead EKG if indicated  - Evaluate effect movement last night. Pt able to stand up from bed with 1 assist and sat on the rolling chair, and wheeled to the bathroom. Pt still gets light headed when getting up and during ambulation.  Discharge plan stills the same; discharge to [facility]

## 2020-05-17 PROCEDURE — 99232 SBSQ HOSP IP/OBS MODERATE 35: CPT | Performed by: HOSPITALIST

## 2020-05-17 NOTE — PROGRESS NOTES
[facility]    Progress Note    Thomas Knows Patient Status:  Inpatient    1940 MRN [id number]   Location [facility]5W Attending Lisa Joseph MD   Hosp Day # 29 PCP Boubacar Buchanan MD        Subjective: Date    WBC 7.2 05/15/2020    HGB 11.0 (L) 05/15/2020    HCT 33.4 (L) 05/15/2020    .0 05/15/2020    CREATSERUM 0.56 (L) 05/15/2020    BUN 17 05/15/2020     05/15/2020    K 4.0 05/16/2020     05/15/2020    CO2 31.0 05/15/2020    GLU 80 0

## 2020-05-17 NOTE — PLAN OF CARE
Problem: Patient/Family Goals  Goal: Patient/Family Long Term Goal  Description  Patient's Long Term Goal: To go home    Interventions:   Identify needs for rehab   Follow PT/OT recommendation   Take medication as prescribed  - See additional Care Plan g SAFETY ADULT - FALL  Goal: Free from fall injury  Description  INTERVENTIONS:  - Assess pt frequently for physical needs  - Identify cognitive and physical deficits and behaviors that affect risk of falls.   - [place] fall precautions as indicated by asse cough, deep breathe, Incentive Spirometry  - Assess the need for suctioning and perform as needed  - Assess and instruct to report SOB or any respiratory difficulty  - Respiratory Therapy support as indicated  - Manage/alleviate anxiety  - Monitor for sign

## 2020-05-17 NOTE — PLAN OF CARE
Pt resting in chair, ambulates with assistance. Received PRN tylenol for mild back pain. On 1.5L O2, weaning as tolerated. SOB with exertion. [facility] DC to [facility] pending insurance auth and med clearance.      Problem: Patient/Family Goal techniques  - Monitor for opioid side effects  - Notify MD/LIP if interventions unsuccessful or patient reports new pain  - Anticipate increased pain with activity and pre-medicate as appropriate  Outcome: Progressing     Problem: SAFETY ADULT - FALL  Goal communication style  - Implement communication aides and strategies  - Use visual cues when possible  - Listen attentively, be patient, do not interrupt  - Minimize distractions  - Allow time for understanding and response  - Establish method for patient t lead EKG if indicated  - Evaluate effectiveness of antiarrhythmic and heart rate control medications as ordered  - Initiate emergency measures for life threatening arrhythmias  - Monitor electrolytes and administer replacement therapy as ordered  Outcome: encourage patient's normal rest cycle i.e. lights off, TV off, minimize noise and interruptions  - Encourage family to assist in orientation and promotion of home routines  Outcome: Progressing

## 2020-05-18 PROCEDURE — 99232 SBSQ HOSP IP/OBS MODERATE 35: CPT | Performed by: HOSPITALIST

## 2020-05-18 NOTE — OCCUPATIONAL THERAPY NOTE
OCCUPATIONAL THERAPY TREATMENT NOTE - INPATIENT        Room Number: 167/982-P                Problem List  Principal Problem:    ZIINJ-62 virus infection  Active Problems:    Acute respiratory failure with hypoxia ([facility] [address]      OCCUPATIONAL THERAPY ASSESSMENT 7/10 RPE following first walk. Pt oriented to self, place, and date --pt reported he orients himself daily by looking at his watch.      The patient's Approx Degree of Impairment: 53.32% has been calculated based on documentation in the [facility] '6 clicks' : Not tested  Sit to Stand: Minimum assistance  Chair Transfer: Min A   Bedroom Mobility: Mod A with RW    BALANCE ASSESSMENT  Static Sitting: good  Dynamic Sitting: good  Static Standing: fair+  Dynamic Standing: fair    FUNCTIONAL ADL ASSESSMENT  Groomin

## 2020-05-18 NOTE — PHYSICAL THERAPY NOTE
PHYSICAL THERAPY TREATMENT NOTE - INPATIENT     Room Number: 704/734-C       Presenting Problem: +COVID-19; Intubated 4/30;  Extubated 5/7    Problem List  Principal Problem:    COVID-19 virus infection  Active Problems:    Acute respiratory failure with hy training;Family education;Patient education; Energy conservation; Endurance; Body mechanics; Coordination;Balance training;Stair training;Stoop training;Strengthening    SUBJECTIVE  Pt  his wife on Valentine's Day.  He has 3 children and 10 grandchildren reach;RN aware of session/findings; All patient questions and concerns addressed; Alarm set; Discussed recommendations with /    CURRENT GOALS   Goals to be met by: 5/25/2020  Patient Goal Patient's self-stated goal is: not stated   G

## 2020-05-18 NOTE — DIETARY NOTE
ADULT NUTRITION REASSESSMENT     RECOMMENDATIONS TO MD:  CPM     Pt is at moderate nutrition risk.       NUTRITION DIAGNOSIS/PROBLEM:  Inadequate oral intake related to decreased ability to consume sufficient amount of energy in the setting of viral acute i 1,.2 with fiber to help in motility. Na & Osmolality starting to elevate, noting 1.7 Liter free water Deficit. Will slightly increase water flushes from 30 to 60 ml q 4 hrs. And will monitor and adjust as needed.  Per MD, COVID-19, c/b AHRF/ARDS, Currently hypocaloric intake. On prednisone. NUTRITION PLANS / INTERVENTIONS:  - Meals and snacks: Liberalized diet, Initiated small frequent meals and Encouraged increased PO intake (lifted Low fiber/soft diet).    - Medical Food or Oral Nutrition Supplements (O Current Appetite: Good and Improved   Intake: Majority of intake % in 2-3 meals/day.     Intake Meeting Needs: Yes and supplement to maximize intake  Food Allergies: No   Cultural/Ethnic/Latter day Preferences: Obtained    MEDICATIONS : reviewed :

## 2020-05-18 NOTE — PROGRESS NOTES
[facility]    Progress Note    Marjorie Tabares Patient Status:  Inpatient    1940 MRN [id number]   Location [facility]5W Attending Shemar Bolden MD   Hosp Day # 34 PCP Boubacar Buchanan MD        Subjective: 05/15/2020    HGB 11.0 (L) 05/15/2020    HCT 33.4 (L) 05/15/2020    .0 05/15/2020    CREATSERUM 0.56 (L) 05/15/2020    BUN 17 05/15/2020     05/15/2020    K 4.0 05/16/2020     05/15/2020    CO2 31.0 05/15/2020    GLU 80 05/15/2020    CA

## 2020-05-18 NOTE — PAYOR COMM NOTE
--------------  CONTINUED STAY REVIEW    PayorLorri Najjar MA [facility]  Subscriber #:  [id number]  Authorization Number: [id number]      Admit date: 4/19/20  Admit time: 200    Admitting Physician: Ping Burns MD  Attending Physician:  Ping Burns MD 5/17/2020 2054 Given (none) Topical Anibal Bustamante RN    5/17/2020 0914 Given (none) Topical Raheem Ahumada RN      PEG [address] [facility]) powder packet 17 g     Date Action Dose Route User    5/17/2020 0940 Given 17 g Oral Raheem Ahumada RN      predniSON S.p convalescent plasma  SLP following - ok for puree, nectar thick  PT OT rec acute  PMR consult  SW for placement, will likely need rehab  Pulmonary following     HL  Statin     HTN  CPM     Chest pain  Plan stress test as outpt      Chemical pancreatiti

## 2020-05-18 NOTE — CM/SW NOTE
Addendum 1126:    Per Tracey @ [facility], pt insurance is still pending. PT/OT updates requested. RN informed. PADMAJA CHAMBERS for Tracey @ [facility] ([phone]) to discuss d/c planning. Pt is at [address]   Criteria for d/c per acute rehab:  Pt needs to be on 4L NC O

## 2020-05-18 NOTE — PLAN OF CARE
Problem: Patient/Family Goals  Goal: Patient/Family Long Term Goal  Description  Patient's Long Term Goal: To go home    Interventions:   Identify needs for rehab   Follow PT/OT recommendation   Take medication as prescribed  - See additional Care Plan g SAFETY ADULT - FALL  Goal: Free from fall injury  Description  INTERVENTIONS:  - Assess pt frequently for physical needs  - Identify cognitive and physical deficits and behaviors that affect risk of falls.   - [place] fall precautions as indicated by asse method for patient to ask for assistance (call light)  - Provide an  as needed  - Communicate barriers and strategies to overcome with those who interact with patient  Outcome: Progressing     Problem: RESPIRATORY - ADULT  Goal: Achieves optimal as ordered  Outcome: Progressing     Problem: GASTROINTESTINAL - ADULT  Goal: Minimal or absence of nausea and vomiting  Description  INTERVENTIONS:  - Maintain adequate hydration with IV or PO as ordered and tolerated  - Nasogastric tube to low intermitte

## 2020-05-18 NOTE — PLAN OF CARE
Problem: Patient/Family Goals  Goal: Patient/Family Long Term Goal  Description  Patient's Long Term Goal: To go home    Interventions:   Identify needs for rehab   Follow PT/OT recommendation   Take medication as prescribed  - See additional Care Plan g SAFETY ADULT - FALL  Goal: Free from fall injury  Description  INTERVENTIONS:  - Assess pt frequently for physical needs  - Identify cognitive and physical deficits and behaviors that affect risk of falls.   - [place] fall precautions as indicated by asse method for patient to ask for assistance (call light)  - Provide an  as needed  - Communicate barriers and strategies to overcome with those who interact with patient  Outcome: Progressing     Problem: RESPIRATORY - ADULT  Goal: Achieves optimal as ordered  Outcome: Progressing     Problem: GASTROINTESTINAL - ADULT  Goal: Minimal or absence of nausea and vomiting  Description  INTERVENTIONS:  - Maintain adequate hydration with IV or PO as ordered and tolerated  - Nasogastric tube to low intermitte maintained on O2 NC, denies any SOB at this time. Pt voiding per urinal, was able to ambulate short distances x 1 assist and a walker to the chair.  Plan to d/c to [facility] when medically stable and pending insurance approval.

## 2020-05-19 VITALS
DIASTOLIC BLOOD PRESSURE: 57 MMHG | OXYGEN SATURATION: 97 % | WEIGHT: 163.88 LBS | HEIGHT: 69.02 IN | BODY MASS INDEX: 24.27 KG/M2 | TEMPERATURE: 99 F | HEART RATE: 89 BPM | SYSTOLIC BLOOD PRESSURE: 125 MMHG | RESPIRATION RATE: 20 BRPM

## 2020-05-19 PROCEDURE — 99239 HOSP IP/OBS DSCHRG MGMT >30: CPT | Performed by: HOSPITALIST

## 2020-05-19 RX ORDER — PREDNISONE 20 MG/1
20 TABLET ORAL
Qty: 5 TABLET | Refills: 0 | Status: SHIPPED | OUTPATIENT
Start: 2020-05-20

## 2020-05-19 RX ORDER — ZOLPIDEM TARTRATE 5 MG/1
5 TABLET ORAL NIGHTLY PRN
Qty: 5 TABLET | Refills: 0 | Status: SHIPPED | OUTPATIENT
Start: 2020-05-19 | End: 2020-05-24

## 2020-05-19 RX ORDER — ALBUTEROL SULFATE 90 UG/1
2 AEROSOL, METERED RESPIRATORY (INHALATION) EVERY 6 HOURS PRN
Qty: 1 INHALER | Refills: 0 | Status: SHIPPED | OUTPATIENT
Start: 2020-05-19

## 2020-05-19 RX ORDER — MELATONIN
325
Qty: 30 TABLET | Refills: 0 | Status: SHIPPED | OUTPATIENT
Start: 2020-05-20

## 2020-05-19 RX ORDER — ISOSORBIDE DINITRATE 5 MG/1
5 TABLET ORAL
Qty: 90 TABLET | Refills: 0 | Status: SHIPPED | OUTPATIENT
Start: 2020-05-19

## 2020-05-19 RX ORDER — SENNA PLUS 8.6 MG/1
8.6 TABLET ORAL 2 TIMES DAILY
Qty: 14 TABLET | Refills: 0 | Status: SHIPPED | OUTPATIENT
Start: 2020-05-19

## 2020-05-19 NOTE — SLP NOTE
SLP attempt this PM. Per RN Ruth Carson, pt is preparing for discharge to [facility]. SLP to sign off at this time. Please contact SLP with any questions, concerns, and/or change in status. Thank you. Amy M. Terri Snellen. Quillian Brazil  Speech Language Path

## 2020-05-19 NOTE — CM/SW NOTE
Updates, MAR, and vitals provided to [facility] via [employer]. Insurance approval received.     ELMO Garcia, [facility]  Social Work   KLO:#[id number]

## 2020-05-19 NOTE — PLAN OF CARE
Pt alert and oriented. No complaints of pain. Plan is to DC to [facility] today. Wife and son updated on the plan of care. PICC removed per order.      Problem: Patient/Family Goals  Goal: Patient/Family Long Term Goal  Description  Patient's Long Ter effects  - Notify MD/LIP if interventions unsuccessful or patient reports new pain  - Anticipate increased pain with activity and pre-medicate as appropriate  Outcome: Adequate for Discharge     Problem: SAFETY ADULT - FALL  Goal: Free from fall injury  Ann-Marie communication style  - Implement communication aides and strategies  - Use visual cues when possible  - Listen attentively, be patient, do not interrupt  - Minimize distractions  - Allow time for understanding and response  - Establish method for patient t monitoring, monitor vital signs, obtain 12 lead EKG if indicated  - Evaluate effectiveness of antiarrhythmic and heart rate control medications as ordered  - Initiate emergency measures for life threatening arrhythmias  - Monitor electrolytes and administe frequent reorientation  - Promote wakefulness i.e. lights on, blinds open  - Promote sleep, encourage patient's normal rest cycle i.e. lights off, TV off, minimize noise and interruptions  - Encourage family to assist in orientation and promotion of home r

## 2020-05-19 NOTE — PLAN OF CARE
Problem: Patient/Family Goals  Goal: Patient/Family Long Term Goal  Description  Patient's Long Term Goal: To go home    Interventions:   Identify needs for rehab   Follow PT/OT recommendation   Take medication as prescribed  - See additional Care Plan g SAFETY ADULT - FALL  Goal: Free from fall injury  Description  INTERVENTIONS:  - Assess pt frequently for physical needs  - Identify cognitive and physical deficits and behaviors that affect risk of falls.   - [place] fall precautions as indicated by asse method for patient to ask for assistance (call light)  - Provide an  as needed  - Communicate barriers and strategies to overcome with those who interact with patient  Outcome: Progressing     Problem: RESPIRATORY - ADULT  Goal: Achieves optimal

## 2020-05-19 NOTE — CM/SW NOTE
05/19/20 1100   Discharge disposition   Expected discharge disposition Short Term H   Name of [address] [place] [address]   Patient is Discharged to a [address] [place] [address] Yes   Discharge transportation [facility]  (1:30 per [place]

## 2020-05-19 NOTE — DISCHARGE PLANNING
I called and gave report to nurse Sanchez at [address] [place] [address] rehab facility.  Patient's physical and history were relayed to nursing staff and included past medical history, admitting diagnosis of COVID and treatment patient received as well as a current

## 2020-05-20 NOTE — DISCHARGE SUMMARY
[facility]    PATIENT'S NAME: Deanna Urbina PHYSICIAN: Maite Olson MD   PATIENT ACCOUNT#:   [id number]    LOCATION:  [address] [place] [address],[address] RECORD #:   [id number]       YOB: 1940  ADMISSION DATE:       04/19/202 EXAMINATION:    VITAL SIGNS:  The patient's blood pressure is 114/53, pulse 92, temperature 98.3, saturating 93%. GENERAL:  The patient appeared to be in no acute distress. He is comfortable. SKIN:  Warm and hydrated.   HEENT:  Head was atraumatic and no

## 2020-05-22 NOTE — PAYOR COMM NOTE
--------------  DISCHARGE REVIEW    Yanci Payan MA [facility]  Subscriber #:  [id number]  Authorization Number: [id number]      Admit date: 4/19/20  Admit time:  2200  Discharge Date: 5/19/2020  1:59 PM       DISCHARGE SUMMARY    DISCHARGE DIAGNOSES:    1.    [place]

## 2021-10-28 NOTE — PROGRESS NOTES
"Pre-admit appointment completed. \"Preparing for your Procedure\" instructions given to Pt with verbal, written, and video content. Pt states all instructions given are understood and to call pre-admit or Dr's office for additional questions or any symptoms of illness/covid develop prior to DOS. Medications the patient will take the morning of surgery per anesthesia protocol: Wellbutrin, Lexapro, Prilosec. Instructed to take other prescribed medicines through the day before surgery.      " [facility]    Progress Note      Assessment and Plan:   1. Novel coronavirus infection–the patient is on BiPAP 12/8 and the chest x-ray today suggests slight worsening of the infiltrates.   The patient has received azithromycin, Plaquenil a diffuse left greater than right infiltrates    Lul Heath MD  Medical Director, Critical Care, [facility]  Medical Director, [facility]  Pager: [phone]

## (undated) NOTE — IP AVS SNAPSHOT
Patient Demographics     Address  [address] [place] [address] Phone  [phone] [facility])  [phone] [facility] E-mail Address  [email or web address]      Emergency Contact(s)     Name Relation Home Work Marcella [place] Spouse [phone] Next dose due: Tomorrow morning with breakfast      Take 1 tablet (20 mg total) by mouth daily with breakfast.   Mansoor Delgadillo MD         rivaroxaban 10 MG Tabs  Commonly known as:  Tirso Dudley  Next dose due:   Tonight with dinner      Take 1 tablet (10 mg [id number] isosorbide dinitrate (ISORDIL TITRADOSE) tab 5 mg 05/18/20 1804 Given      [id number] isosorbide dinitrate (ISORDIL TITRADOSE) tab 5 mg 05/19/20 0859 Given      [id number] predniSONE (DELTASONE) tab 20 mg 05/19/20 0859 Given            RIGHT LOWER Specimen:  Blood,peripheral      Blood Culture Result No Growth 5 Days    Sputum culture Once [[id number]] Collected:  05/01/20 3050    Order Status:  Completed Lab Status:  Final result Updated:  05/03/20 3985    Specimen:  Other from Endotracheal aspirat Date:  4/19/2020  Date of Admission:  4/19/2020    Chief Complaint:[SA.1]  Patient presents with:  Dyspnea NEELAM SOB[SA.2]      History of Present Illness:[SA.1]  Jodi Santamaria[SA. 2] is a(n)[SA.3 67year old[SA.2] male,[SA.1] with no significant past medi Neck:  Supple, non-tender, no carotid bruit, no jugular venous distention, no lymphadenopathy, no thyromegaly. Respiratory:[SA.1] No appreciable crackles[SA.4], respirations are[SA. 1] moderately[SA. 4]-labored, breath sounds are equal, symmetrical chest wa Secondary[SA. 1] to COVID-19 pneumonitis. Currently on supplemental O2 pulmonary has been consulted will continue to monitor closely. Viral pneumonitis likely secondary to COVID-19  Rapid[SA. 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 No pertinent surgical history.     [COMPLETED] potassium chloride IVPB premix 20 mEq, 20 mEq, Intravenous, Once  [START ON 5/13/2020] ferrous sulfate EC tab 325 mg, 325 mg, Oral, Guille Normal Saline Flush 0.9 % injection 10 mL, 10 mL, Intravenous, PRN  [] lidocaine (XYLOCAINE) 1% injection, 0.5 mL, Other, Once PRN  [] propofol (DIPRIVAN) 10 MG/ML injection, , ,   morphINE sulfate (PF) 2 MG/ML injection 2 mg, 2 mg, Intraveno CREATSERUM 0.43 05/12/2020    BUN 12 05/12/2020     05/12/2020    K 3.7 05/12/2020     05/12/2020    CO2 33.0 05/12/2020    GLU 92 05/12/2020    CA 8.4 05/12/2020    ALB 2.5 05/12/2020    ALKPHO 61 05/12/2020    BILT 0.5 05/12/2020    TP 5.8 0 Psychiatric: Awake, alert and oriented x 2. Able to register and recall 0/3                                       items. No notes of this type exist for this encounter.         Physical Therapy Notes (last 72 hours) (Notes from 5/16/2020 12:55 PM through 5/19/2020 12:55 PM)      Physical Therapy Note signed by Kelley Chavez PT at 5/18/2020  4:02 PM  Version 1 of 1    Auth Short Form. Research supports that patients with this level of impairment may benefit from Acute Rehab. This therapist is recommending ACUTE REHAB at hospital d/c. Prior to hospital admission, pt was independent w/ ADLs, amb w/o AD, and driving.  He is Standardized Score (AM-PAC Scale): 40.78   CMS Modifier (G-Code): CK    FUNCTIONAL ABILITY STATUS  Gait Assessment   Gait Assistance:  Moderate assistance(w/ chair follow)  Distance (ft): 15ft x 2  Assistive Device: Rolling walker  Pattern: Shuffle  Stoop/C Filed:  5/18/2020  4:04 PM Date of Service:  5/18/2020  3:22 PM Status:  Addendum    :  Rachel Llanos OT (Occupational Therapist)    Related Notes:  Original Note by Rachel Llanos OT (Occupational Therapist) filed at 5/18/2020 intermittent education to maximize safety and progress participation, therapist facilitated the following: functional transfer, ambulation with RW (15 ft x 2 with RW and close chair follow), LE dressing in sitting, and grooming in standing at the sink. -   Bathing (including washing, rinsing, drying)?: A Lot  -   Toileting, which includes using toilet, bedpan or urinal? : A Lot  -   Putting on and taking off regular upper body clothing?: A Little  -   Taking care of personal grooming such as brushing jj Occupational Therapist    Filed:  5/18/2020  4:03 PM Date of Service:  5/18/2020  3:22 PM Status:  Signed    :  Mitzy Antunez OT (Occupational Therapist)    Related Notes:  Addendum by Mitzy Antunez OT (Occupational Therapist) f Author:  MILLY Victor Service:  Alex Sheppard Type:  Speech and Language Pathologist    Filed:  5/15/2020  3:31 PM Date of Service:  5/15/2020  3:21 PM Status:  Signed    :  MILLY Victor (Speech and Language Pathologist)       SPEECH DAILY NOTE - I Diet Recommendations - Liquid: Thin    Compensatory Strategies Recommended: No straws;Small bites and sips; Alternate consistencies; Slow rate  Aspiration Precautions: Upright position; Slow rate;Small bites and sips; No straw  Medication Administration Recomm The patient will tolerate trial upgrade of chopped/regular consistency and thin liquids without overt signs or symptoms of aspiration with 90 % accuracy over 1-2 session(s).  (as dentures available)  Pt upgraded to regular consistencies secondary to denture

## (undated) NOTE — IP AVS SNAPSHOT
[facility]            (For Outpatient Use Only) Initial Admit Date: 4/19/2020   Inpt/Obs Admit Date: Inpt: 4/19/20 / Obs: N/A   Discharge Date:    Angelika Abreu:  [de-identified]   MRN: [de-identified]   CSN: [id number]   CEID: [id number]        [facility] Subscriber ID:  Pt Rel to Subscriber:    Hospital Account Financial Class: Medicare Advantage    May 19, 2020

## (undated) NOTE — LETTER
[address] [facility]     I agree to have a Peripherally Inserted Central Catheter (PICC) placed in my arm.    1. The PICC insertion procedure, care, maintenance, risks, benefits, and complications have been explained to me b also discussed reasonable alternatives to the PICC, including risks, benefits, and side effects related to the alternatives and risks related to not receiving this procedure.     8.  I have expressed any questions about this procedure to my physician or the